# Patient Record
Sex: MALE | Race: WHITE | NOT HISPANIC OR LATINO | Employment: FULL TIME | URBAN - METROPOLITAN AREA
[De-identification: names, ages, dates, MRNs, and addresses within clinical notes are randomized per-mention and may not be internally consistent; named-entity substitution may affect disease eponyms.]

---

## 2019-06-21 ENCOUNTER — TRANSCRIBE ORDERS (OUTPATIENT)
Dept: ADMINISTRATIVE | Facility: HOSPITAL | Age: 44
End: 2019-06-21

## 2019-06-21 DIAGNOSIS — R07.89 CHEST WALL PAIN: Primary | ICD-10-CM

## 2019-06-26 ENCOUNTER — HOSPITAL ENCOUNTER (OUTPATIENT)
Dept: CT IMAGING | Facility: HOSPITAL | Age: 44
Discharge: HOME | End: 2019-06-26
Admitting: FAMILY MEDICINE

## 2019-06-26 DIAGNOSIS — R07.89 CHEST WALL PAIN: ICD-10-CM

## 2019-06-26 PROCEDURE — 71250 CT THORAX DX C-: CPT

## 2020-02-07 ENCOUNTER — ANESTHESIA EVENT (OUTPATIENT)
Dept: PERIOP | Facility: HOSPITAL | Age: 45
End: 2020-02-07

## 2020-02-07 ENCOUNTER — HOSPITAL ENCOUNTER (OUTPATIENT)
Facility: HOSPITAL | Age: 45
Discharge: HOME | End: 2020-02-07
Attending: EMERGENCY MEDICINE | Admitting: SURGERY

## 2020-02-07 ENCOUNTER — APPOINTMENT (OUTPATIENT)
Dept: CT IMAGING | Facility: HOSPITAL | Age: 45
End: 2020-02-07

## 2020-02-07 ENCOUNTER — ANESTHESIA (OUTPATIENT)
Dept: PERIOP | Facility: HOSPITAL | Age: 45
End: 2020-02-07

## 2020-02-07 VITALS
WEIGHT: 193 LBS | DIASTOLIC BLOOD PRESSURE: 67 MMHG | HEART RATE: 72 BPM | HEIGHT: 71 IN | SYSTOLIC BLOOD PRESSURE: 111 MMHG | TEMPERATURE: 98.6 F | OXYGEN SATURATION: 95 % | RESPIRATION RATE: 16 BRPM | BODY MASS INDEX: 27.02 KG/M2

## 2020-02-07 DIAGNOSIS — K35.80 ACUTE APPENDICITIS, UNSPECIFIED ACUTE APPENDICITIS TYPE: Primary | ICD-10-CM

## 2020-02-07 DIAGNOSIS — K37 APPENDICITIS: ICD-10-CM

## 2020-02-07 DIAGNOSIS — R10.33 PERIUMBILICAL ABDOMINAL PAIN: ICD-10-CM

## 2020-02-07 DIAGNOSIS — K35.890 OTHER ACUTE APPENDICITIS: ICD-10-CM

## 2020-02-07 LAB
ALBUMIN SERPL-MCNC: 4.4 G/DL (ref 3.5–5.2)
ALBUMIN/GLOB SERPL: 1.3 G/DL
ALP SERPL-CCNC: 111 U/L (ref 39–117)
ALT SERPL W P-5'-P-CCNC: 45 U/L (ref 1–41)
ANION GAP SERPL CALCULATED.3IONS-SCNC: 11 MMOL/L (ref 5–15)
AST SERPL-CCNC: 33 U/L (ref 1–40)
BASOPHILS # BLD AUTO: 0.03 10*3/MM3 (ref 0–0.2)
BASOPHILS NFR BLD AUTO: 0.5 % (ref 0–1.5)
BILIRUB SERPL-MCNC: 0.4 MG/DL (ref 0.2–1.2)
BILIRUB UR QL STRIP: NEGATIVE
BUN BLD-MCNC: 18 MG/DL (ref 6–20)
BUN/CREAT SERPL: 19.4 (ref 7–25)
CALCIUM SPEC-SCNC: 9.3 MG/DL (ref 8.6–10.5)
CHLORIDE SERPL-SCNC: 102 MMOL/L (ref 98–107)
CLARITY UR: CLEAR
CO2 SERPL-SCNC: 27 MMOL/L (ref 22–29)
COLOR UR: YELLOW
CREAT BLD-MCNC: 0.93 MG/DL (ref 0.76–1.27)
DEPRECATED RDW RBC AUTO: 39.2 FL (ref 37–54)
EOSINOPHIL # BLD AUTO: 0.18 10*3/MM3 (ref 0–0.4)
EOSINOPHIL NFR BLD AUTO: 2.8 % (ref 0.3–6.2)
ERYTHROCYTE [DISTWIDTH] IN BLOOD BY AUTOMATED COUNT: 12.1 % (ref 12.3–15.4)
GFR SERPL CREATININE-BSD FRML MDRD: 88 ML/MIN/1.73
GLOBULIN UR ELPH-MCNC: 3.4 GM/DL
GLUCOSE BLD-MCNC: 111 MG/DL (ref 65–99)
GLUCOSE UR STRIP-MCNC: NEGATIVE MG/DL
HCT VFR BLD AUTO: 48.2 % (ref 37.5–51)
HGB BLD-MCNC: 16.4 G/DL (ref 13–17.7)
HGB UR QL STRIP.AUTO: NEGATIVE
HOLD SPECIMEN: NORMAL
HOLD SPECIMEN: NORMAL
IMM GRANULOCYTES # BLD AUTO: 0.02 10*3/MM3 (ref 0–0.05)
IMM GRANULOCYTES NFR BLD AUTO: 0.3 % (ref 0–0.5)
KETONES UR QL STRIP: NEGATIVE
LEUKOCYTE ESTERASE UR QL STRIP.AUTO: NEGATIVE
LIPASE SERPL-CCNC: 31 U/L (ref 13–60)
LYMPHOCYTES # BLD AUTO: 1.5 10*3/MM3 (ref 0.7–3.1)
LYMPHOCYTES NFR BLD AUTO: 23.5 % (ref 19.6–45.3)
MCH RBC QN AUTO: 30.1 PG (ref 26.6–33)
MCHC RBC AUTO-ENTMCNC: 34 G/DL (ref 31.5–35.7)
MCV RBC AUTO: 88.6 FL (ref 79–97)
MONOCYTES # BLD AUTO: 0.52 10*3/MM3 (ref 0.1–0.9)
MONOCYTES NFR BLD AUTO: 8.1 % (ref 5–12)
NEUTROPHILS # BLD AUTO: 4.14 10*3/MM3 (ref 1.7–7)
NEUTROPHILS NFR BLD AUTO: 64.8 % (ref 42.7–76)
NITRITE UR QL STRIP: NEGATIVE
NRBC BLD AUTO-RTO: 0 /100 WBC (ref 0–0.2)
PH UR STRIP.AUTO: 7 [PH] (ref 5–8)
PLATELET # BLD AUTO: 182 10*3/MM3 (ref 140–450)
PMV BLD AUTO: 10 FL (ref 6–12)
POTASSIUM BLD-SCNC: 3.9 MMOL/L (ref 3.5–5.2)
PROT SERPL-MCNC: 7.8 G/DL (ref 6–8.5)
PROT UR QL STRIP: NEGATIVE
RBC # BLD AUTO: 5.44 10*6/MM3 (ref 4.14–5.8)
SODIUM BLD-SCNC: 140 MMOL/L (ref 136–145)
SP GR UR STRIP: 1.03 (ref 1–1.03)
UROBILINOGEN UR QL STRIP: ABNORMAL
WBC NRBC COR # BLD: 6.39 10*3/MM3 (ref 3.4–10.8)
WHOLE BLOOD HOLD SPECIMEN: NORMAL
WHOLE BLOOD HOLD SPECIMEN: NORMAL

## 2020-02-07 PROCEDURE — 25010000002 IOPAMIDOL 61 % SOLUTION: Performed by: EMERGENCY MEDICINE

## 2020-02-07 PROCEDURE — G0378 HOSPITAL OBSERVATION PER HR: HCPCS

## 2020-02-07 PROCEDURE — 25010000002 NEOSTIGMINE 10 MG/10ML SOLUTION: Performed by: NURSE ANESTHETIST, CERTIFIED REGISTERED

## 2020-02-07 PROCEDURE — 25010000002 CEFOXITIN PER 1 G: Performed by: SURGERY

## 2020-02-07 PROCEDURE — 25010000002 FENTANYL CITRATE (PF) 100 MCG/2ML SOLUTION: Performed by: NURSE ANESTHETIST, CERTIFIED REGISTERED

## 2020-02-07 PROCEDURE — 88304 TISSUE EXAM BY PATHOLOGIST: CPT | Performed by: SURGERY

## 2020-02-07 PROCEDURE — 81003 URINALYSIS AUTO W/O SCOPE: CPT | Performed by: EMERGENCY MEDICINE

## 2020-02-07 PROCEDURE — 83690 ASSAY OF LIPASE: CPT | Performed by: EMERGENCY MEDICINE

## 2020-02-07 PROCEDURE — 85025 COMPLETE CBC W/AUTO DIFF WBC: CPT | Performed by: EMERGENCY MEDICINE

## 2020-02-07 PROCEDURE — 25010000002 ONDANSETRON PER 1 MG: Performed by: EMERGENCY MEDICINE

## 2020-02-07 PROCEDURE — 25010000002 ONDANSETRON PER 1 MG: Performed by: SURGERY

## 2020-02-07 PROCEDURE — 80053 COMPREHEN METABOLIC PANEL: CPT | Performed by: EMERGENCY MEDICINE

## 2020-02-07 PROCEDURE — 25010000002 DEXAMETHASONE PER 1 MG: Performed by: NURSE ANESTHETIST, CERTIFIED REGISTERED

## 2020-02-07 PROCEDURE — 25010000002 HYDROMORPHONE PER 4 MG: Performed by: SURGERY

## 2020-02-07 PROCEDURE — 25010000002 PROPOFOL 10 MG/ML EMULSION: Performed by: NURSE ANESTHETIST, CERTIFIED REGISTERED

## 2020-02-07 PROCEDURE — 99244 OFF/OP CNSLTJ NEW/EST MOD 40: CPT | Performed by: SURGERY

## 2020-02-07 PROCEDURE — 99284 EMERGENCY DEPT VISIT MOD MDM: CPT

## 2020-02-07 PROCEDURE — 74177 CT ABD & PELVIS W/CONTRAST: CPT

## 2020-02-07 PROCEDURE — 44970 LAPAROSCOPY APPENDECTOMY: CPT | Performed by: SURGERY

## 2020-02-07 PROCEDURE — 96374 THER/PROPH/DIAG INJ IV PUSH: CPT

## 2020-02-07 PROCEDURE — 25010000002 CEFOXITIN PER 1 G: Performed by: EMERGENCY MEDICINE

## 2020-02-07 PROCEDURE — 25010000002 ONDANSETRON PER 1 MG: Performed by: NURSE ANESTHETIST, CERTIFIED REGISTERED

## 2020-02-07 DEVICE — ETS45 RELOAD STANDARD 45MM
Type: IMPLANTABLE DEVICE | Status: FUNCTIONAL
Brand: ENDOPATH

## 2020-02-07 DEVICE — ENDOPATH ETS45 2.5MM RELOADS (VASCULAR/THIN)
Type: IMPLANTABLE DEVICE | Status: FUNCTIONAL
Brand: ENDOPATH

## 2020-02-07 RX ORDER — METOCLOPRAMIDE HYDROCHLORIDE 5 MG/ML
10 INJECTION INTRAMUSCULAR; INTRAVENOUS EVERY 6 HOURS PRN
Status: DISCONTINUED | OUTPATIENT
Start: 2020-02-07 | End: 2020-02-08 | Stop reason: HOSPADM

## 2020-02-07 RX ORDER — CETIRIZINE HYDROCHLORIDE 10 MG/1
10 TABLET ORAL DAILY
COMMUNITY

## 2020-02-07 RX ORDER — LABETALOL HYDROCHLORIDE 5 MG/ML
5 INJECTION, SOLUTION INTRAVENOUS
Status: DISCONTINUED | OUTPATIENT
Start: 2020-02-07 | End: 2020-02-07 | Stop reason: HOSPADM

## 2020-02-07 RX ORDER — ACETAMINOPHEN 500 MG
1000 TABLET ORAL EVERY 12 HOURS
Status: DISCONTINUED | OUTPATIENT
Start: 2020-02-07 | End: 2020-02-07

## 2020-02-07 RX ORDER — ATRACURIUM BESYLATE 10 MG/ML
INJECTION, SOLUTION INTRAVENOUS AS NEEDED
Status: DISCONTINUED | OUTPATIENT
Start: 2020-02-07 | End: 2020-02-07 | Stop reason: SURG

## 2020-02-07 RX ORDER — PROMETHAZINE HYDROCHLORIDE 25 MG/ML
12.5 INJECTION, SOLUTION INTRAMUSCULAR; INTRAVENOUS ONCE AS NEEDED
Status: DISCONTINUED | OUTPATIENT
Start: 2020-02-07 | End: 2020-02-07 | Stop reason: HOSPADM

## 2020-02-07 RX ORDER — DEXAMETHASONE SODIUM PHOSPHATE 4 MG/ML
INJECTION, SOLUTION INTRA-ARTICULAR; INTRALESIONAL; INTRAMUSCULAR; INTRAVENOUS; SOFT TISSUE AS NEEDED
Status: DISCONTINUED | OUTPATIENT
Start: 2020-02-07 | End: 2020-02-07 | Stop reason: SURG

## 2020-02-07 RX ORDER — HYDROMORPHONE HYDROCHLORIDE 1 MG/ML
0.5 INJECTION, SOLUTION INTRAMUSCULAR; INTRAVENOUS; SUBCUTANEOUS
Status: DISCONTINUED | OUTPATIENT
Start: 2020-02-07 | End: 2020-02-07 | Stop reason: HOSPADM

## 2020-02-07 RX ORDER — SODIUM CHLORIDE, SODIUM LACTATE, POTASSIUM CHLORIDE, CALCIUM CHLORIDE 600; 310; 30; 20 MG/100ML; MG/100ML; MG/100ML; MG/100ML
125 INJECTION, SOLUTION INTRAVENOUS CONTINUOUS
Status: DISCONTINUED | OUTPATIENT
Start: 2020-02-07 | End: 2020-02-07 | Stop reason: HOSPADM

## 2020-02-07 RX ORDER — BUPIVACAINE HYDROCHLORIDE AND EPINEPHRINE 5; 5 MG/ML; UG/ML
INJECTION, SOLUTION EPIDURAL; INTRACAUDAL; PERINEURAL AS NEEDED
Status: DISCONTINUED | OUTPATIENT
Start: 2020-02-07 | End: 2020-02-07 | Stop reason: HOSPADM

## 2020-02-07 RX ORDER — FAMOTIDINE 10 MG/ML
20 INJECTION, SOLUTION INTRAVENOUS ONCE
Status: DISCONTINUED | OUTPATIENT
Start: 2020-02-07 | End: 2020-02-07 | Stop reason: HOSPADM

## 2020-02-07 RX ORDER — ONDANSETRON 2 MG/ML
4 INJECTION INTRAMUSCULAR; INTRAVENOUS
Status: DISCONTINUED | OUTPATIENT
Start: 2020-02-07 | End: 2020-02-07 | Stop reason: HOSPADM

## 2020-02-07 RX ORDER — OXYCODONE HYDROCHLORIDE 5 MG/1
5 TABLET ORAL EVERY 6 HOURS PRN
Qty: 10 TABLET | Refills: 0 | Status: SHIPPED | OUTPATIENT
Start: 2020-02-07 | End: 2020-02-12

## 2020-02-07 RX ORDER — PROMETHAZINE HYDROCHLORIDE 25 MG/1
25 TABLET ORAL ONCE AS NEEDED
Status: DISCONTINUED | OUTPATIENT
Start: 2020-02-07 | End: 2020-02-07 | Stop reason: HOSPADM

## 2020-02-07 RX ORDER — GLYCOPYRROLATE 0.2 MG/ML
INJECTION INTRAMUSCULAR; INTRAVENOUS AS NEEDED
Status: DISCONTINUED | OUTPATIENT
Start: 2020-02-07 | End: 2020-02-07 | Stop reason: SURG

## 2020-02-07 RX ORDER — CETIRIZINE HYDROCHLORIDE 10 MG/1
10 TABLET ORAL DAILY
Status: DISCONTINUED | OUTPATIENT
Start: 2020-02-07 | End: 2020-02-08 | Stop reason: HOSPADM

## 2020-02-07 RX ORDER — DIPHENHYDRAMINE HYDROCHLORIDE 50 MG/ML
25 INJECTION INTRAMUSCULAR; INTRAVENOUS EVERY 4 HOURS PRN
Status: DISCONTINUED | OUTPATIENT
Start: 2020-02-07 | End: 2020-02-08 | Stop reason: HOSPADM

## 2020-02-07 RX ORDER — ONDANSETRON 2 MG/ML
INJECTION INTRAMUSCULAR; INTRAVENOUS AS NEEDED
Status: DISCONTINUED | OUTPATIENT
Start: 2020-02-07 | End: 2020-02-07 | Stop reason: SURG

## 2020-02-07 RX ORDER — HYDROMORPHONE HYDROCHLORIDE 2 MG/1
2 TABLET ORAL
Status: DISCONTINUED | OUTPATIENT
Start: 2020-02-07 | End: 2020-02-08 | Stop reason: HOSPADM

## 2020-02-07 RX ORDER — PROMETHAZINE HYDROCHLORIDE 25 MG/ML
12.5 INJECTION, SOLUTION INTRAMUSCULAR; INTRAVENOUS EVERY 6 HOURS PRN
Status: DISCONTINUED | OUTPATIENT
Start: 2020-02-07 | End: 2020-02-08 | Stop reason: HOSPADM

## 2020-02-07 RX ORDER — PROPOFOL 10 MG/ML
VIAL (ML) INTRAVENOUS AS NEEDED
Status: DISCONTINUED | OUTPATIENT
Start: 2020-02-07 | End: 2020-02-07 | Stop reason: SURG

## 2020-02-07 RX ORDER — SODIUM CHLORIDE 0.9 % (FLUSH) 0.9 %
10 SYRINGE (ML) INJECTION EVERY 12 HOURS SCHEDULED
Status: DISCONTINUED | OUTPATIENT
Start: 2020-02-07 | End: 2020-02-07 | Stop reason: HOSPADM

## 2020-02-07 RX ORDER — FENTANYL CITRATE 50 UG/ML
50 INJECTION, SOLUTION INTRAMUSCULAR; INTRAVENOUS
Status: DISCONTINUED | OUTPATIENT
Start: 2020-02-07 | End: 2020-02-07 | Stop reason: HOSPADM

## 2020-02-07 RX ORDER — CEFOXITIN 1 G/1
2 INJECTION, POWDER, FOR SOLUTION INTRAVENOUS EVERY 6 HOURS
Status: DISCONTINUED | OUTPATIENT
Start: 2020-02-07 | End: 2020-02-07

## 2020-02-07 RX ORDER — OXYCODONE HYDROCHLORIDE 5 MG/1
5 TABLET ORAL EVERY 4 HOURS PRN
Status: DISCONTINUED | OUTPATIENT
Start: 2020-02-07 | End: 2020-02-08 | Stop reason: HOSPADM

## 2020-02-07 RX ORDER — FENTANYL CITRATE 50 UG/ML
INJECTION, SOLUTION INTRAMUSCULAR; INTRAVENOUS AS NEEDED
Status: DISCONTINUED | OUTPATIENT
Start: 2020-02-07 | End: 2020-02-07 | Stop reason: SURG

## 2020-02-07 RX ORDER — SODIUM CHLORIDE 0.9 % (FLUSH) 0.9 %
1-10 SYRINGE (ML) INJECTION AS NEEDED
Status: DISCONTINUED | OUTPATIENT
Start: 2020-02-07 | End: 2020-02-08 | Stop reason: HOSPADM

## 2020-02-07 RX ORDER — ONDANSETRON 4 MG/1
4 TABLET, FILM COATED ORAL EVERY 6 HOURS PRN
Status: DISCONTINUED | OUTPATIENT
Start: 2020-02-08 | End: 2020-02-08 | Stop reason: HOSPADM

## 2020-02-07 RX ORDER — SODIUM CHLORIDE, SODIUM LACTATE, POTASSIUM CHLORIDE, CALCIUM CHLORIDE 600; 310; 30; 20 MG/100ML; MG/100ML; MG/100ML; MG/100ML
9 INJECTION, SOLUTION INTRAVENOUS CONTINUOUS
Status: DISCONTINUED | OUTPATIENT
Start: 2020-02-07 | End: 2020-02-07 | Stop reason: HOSPADM

## 2020-02-07 RX ORDER — NALOXONE HCL 0.4 MG/ML
0.1 VIAL (ML) INJECTION
Status: DISCONTINUED | OUTPATIENT
Start: 2020-02-07 | End: 2020-02-08 | Stop reason: HOSPADM

## 2020-02-07 RX ORDER — SODIUM CHLORIDE 0.9 % (FLUSH) 0.9 %
10 SYRINGE (ML) INJECTION AS NEEDED
Status: DISCONTINUED | OUTPATIENT
Start: 2020-02-07 | End: 2020-02-07 | Stop reason: HOSPADM

## 2020-02-07 RX ORDER — FAMOTIDINE 20 MG/1
20 TABLET, FILM COATED ORAL
Status: DISCONTINUED | OUTPATIENT
Start: 2020-02-07 | End: 2020-02-08 | Stop reason: HOSPADM

## 2020-02-07 RX ORDER — HYDROCODONE BITARTRATE AND ACETAMINOPHEN 7.5; 325 MG/1; MG/1
1 TABLET ORAL ONCE AS NEEDED
Status: DISCONTINUED | OUTPATIENT
Start: 2020-02-07 | End: 2020-02-07 | Stop reason: HOSPADM

## 2020-02-07 RX ORDER — SIMETHICONE 80 MG
80 TABLET,CHEWABLE ORAL 4 TIMES DAILY PRN
Status: DISCONTINUED | OUTPATIENT
Start: 2020-02-07 | End: 2020-02-08 | Stop reason: HOSPADM

## 2020-02-07 RX ORDER — LIDOCAINE HYDROCHLORIDE 10 MG/ML
0.5 INJECTION, SOLUTION EPIDURAL; INFILTRATION; INTRACAUDAL; PERINEURAL ONCE AS NEEDED
Status: DISCONTINUED | OUTPATIENT
Start: 2020-02-07 | End: 2020-02-07 | Stop reason: HOSPADM

## 2020-02-07 RX ORDER — ACETAMINOPHEN 500 MG
1000 TABLET ORAL EVERY 6 HOURS
Status: DISCONTINUED | OUTPATIENT
Start: 2020-02-07 | End: 2020-02-08 | Stop reason: HOSPADM

## 2020-02-07 RX ORDER — LIDOCAINE HYDROCHLORIDE 10 MG/ML
INJECTION, SOLUTION EPIDURAL; INFILTRATION; INTRACAUDAL; PERINEURAL AS NEEDED
Status: DISCONTINUED | OUTPATIENT
Start: 2020-02-07 | End: 2020-02-07 | Stop reason: SURG

## 2020-02-07 RX ORDER — LABETALOL HYDROCHLORIDE 5 MG/ML
10 INJECTION, SOLUTION INTRAVENOUS
Status: DISCONTINUED | OUTPATIENT
Start: 2020-02-07 | End: 2020-02-08 | Stop reason: HOSPADM

## 2020-02-07 RX ORDER — SODIUM CHLORIDE 9 MG/ML
INJECTION, SOLUTION INTRAVENOUS AS NEEDED
Status: DISCONTINUED | OUTPATIENT
Start: 2020-02-07 | End: 2020-02-07 | Stop reason: HOSPADM

## 2020-02-07 RX ORDER — PROMETHAZINE HYDROCHLORIDE 25 MG/1
25 SUPPOSITORY RECTAL ONCE AS NEEDED
Status: DISCONTINUED | OUTPATIENT
Start: 2020-02-07 | End: 2020-02-07 | Stop reason: HOSPADM

## 2020-02-07 RX ORDER — PROMETHAZINE HYDROCHLORIDE 25 MG/1
12.5 TABLET ORAL EVERY 6 HOURS PRN
Status: DISCONTINUED | OUTPATIENT
Start: 2020-02-07 | End: 2020-02-08 | Stop reason: HOSPADM

## 2020-02-07 RX ORDER — ONDANSETRON 2 MG/ML
4 INJECTION INTRAMUSCULAR; INTRAVENOUS EVERY 6 HOURS
Status: DISCONTINUED | OUTPATIENT
Start: 2020-02-07 | End: 2020-02-08 | Stop reason: HOSPADM

## 2020-02-07 RX ORDER — SODIUM CHLORIDE 0.9 % (FLUSH) 0.9 %
3 SYRINGE (ML) INJECTION EVERY 12 HOURS SCHEDULED
Status: DISCONTINUED | OUTPATIENT
Start: 2020-02-07 | End: 2020-02-08 | Stop reason: HOSPADM

## 2020-02-07 RX ORDER — HYDROMORPHONE HYDROCHLORIDE 1 MG/ML
0.5 INJECTION, SOLUTION INTRAMUSCULAR; INTRAVENOUS; SUBCUTANEOUS
Status: DISCONTINUED | OUTPATIENT
Start: 2020-02-07 | End: 2020-02-08 | Stop reason: HOSPADM

## 2020-02-07 RX ORDER — RANITIDINE 150 MG/1
150 TABLET ORAL 2 TIMES DAILY
COMMUNITY

## 2020-02-07 RX ORDER — NEOSTIGMINE METHYLSULFATE 1 MG/ML
INJECTION, SOLUTION INTRAVENOUS AS NEEDED
Status: DISCONTINUED | OUTPATIENT
Start: 2020-02-07 | End: 2020-02-07 | Stop reason: SURG

## 2020-02-07 RX ORDER — FAMOTIDINE 20 MG/1
20 TABLET, FILM COATED ORAL ONCE
Status: COMPLETED | OUTPATIENT
Start: 2020-02-07 | End: 2020-02-07

## 2020-02-07 RX ORDER — SODIUM CHLORIDE, SODIUM LACTATE, POTASSIUM CHLORIDE, CALCIUM CHLORIDE 600; 310; 30; 20 MG/100ML; MG/100ML; MG/100ML; MG/100ML
150 INJECTION, SOLUTION INTRAVENOUS CONTINUOUS
Status: DISCONTINUED | OUTPATIENT
Start: 2020-02-07 | End: 2020-02-08 | Stop reason: HOSPADM

## 2020-02-07 RX ORDER — PROCHLORPERAZINE MALEATE 10 MG
10 TABLET ORAL EVERY 6 HOURS PRN
Status: DISCONTINUED | OUTPATIENT
Start: 2020-02-07 | End: 2020-02-08 | Stop reason: HOSPADM

## 2020-02-07 RX ADMIN — GLYCOPYRROLATE 0.1 MG: 0.2 INJECTION, SOLUTION INTRAMUSCULAR; INTRAVENOUS at 10:24

## 2020-02-07 RX ADMIN — NEOSTIGMINE METHYLSULFATE 2 MG: 1 INJECTION, SOLUTION INTRAVENOUS at 10:24

## 2020-02-07 RX ADMIN — HYDROMORPHONE HYDROCHLORIDE 0.5 MG: 1 INJECTION, SOLUTION INTRAMUSCULAR; INTRAVENOUS; SUBCUTANEOUS at 07:41

## 2020-02-07 RX ADMIN — ACETAMINOPHEN 1000 MG: 500 TABLET, FILM COATED ORAL at 12:12

## 2020-02-07 RX ADMIN — SODIUM CHLORIDE, PRESERVATIVE FREE 10 ML: 5 INJECTION INTRAVENOUS at 09:05

## 2020-02-07 RX ADMIN — SODIUM CHLORIDE, POTASSIUM CHLORIDE, SODIUM LACTATE AND CALCIUM CHLORIDE 9 ML/HR: 600; 310; 30; 20 INJECTION, SOLUTION INTRAVENOUS at 09:05

## 2020-02-07 RX ADMIN — PROPOFOL 200 MG: 10 INJECTION, EMULSION INTRAVENOUS at 09:31

## 2020-02-07 RX ADMIN — SODIUM CHLORIDE, POTASSIUM CHLORIDE, SODIUM LACTATE AND CALCIUM CHLORIDE: 600; 310; 30; 20 INJECTION, SOLUTION INTRAVENOUS at 10:09

## 2020-02-07 RX ADMIN — SODIUM CHLORIDE, POTASSIUM CHLORIDE, SODIUM LACTATE AND CALCIUM CHLORIDE 125 ML/HR: 600; 310; 30; 20 INJECTION, SOLUTION INTRAVENOUS at 05:36

## 2020-02-07 RX ADMIN — FENTANYL CITRATE 100 MCG: 50 INJECTION, SOLUTION INTRAMUSCULAR; INTRAVENOUS at 09:31

## 2020-02-07 RX ADMIN — FAMOTIDINE 20 MG: 20 TABLET ORAL at 18:10

## 2020-02-07 RX ADMIN — ONDANSETRON 4 MG: 2 INJECTION INTRAMUSCULAR; INTRAVENOUS at 09:35

## 2020-02-07 RX ADMIN — FAMOTIDINE 20 MG: 20 TABLET ORAL at 09:11

## 2020-02-07 RX ADMIN — OXYCODONE HYDROCHLORIDE 5 MG: 5 TABLET ORAL at 18:13

## 2020-02-07 RX ADMIN — CEFOXITIN SODIUM 2 G: 1 POWDER, FOR SOLUTION INTRAVENOUS at 12:13

## 2020-02-07 RX ADMIN — ONDANSETRON 4 MG: 2 INJECTION INTRAMUSCULAR; INTRAVENOUS at 18:10

## 2020-02-07 RX ADMIN — LIDOCAINE HYDROCHLORIDE 60 MG: 10 INJECTION, SOLUTION EPIDURAL; INFILTRATION; INTRACAUDAL; PERINEURAL at 09:31

## 2020-02-07 RX ADMIN — ONDANSETRON 4 MG: 2 INJECTION INTRAMUSCULAR; INTRAVENOUS at 03:06

## 2020-02-07 RX ADMIN — CEFOXITIN 2 G: 2 INJECTION, POWDER, FOR SOLUTION INTRAVENOUS at 05:36

## 2020-02-07 RX ADMIN — ONDANSETRON 4 MG: 2 INJECTION INTRAMUSCULAR; INTRAVENOUS at 12:12

## 2020-02-07 RX ADMIN — CEFOXITIN SODIUM 2 G: 1 POWDER, FOR SOLUTION INTRAVENOUS at 18:11

## 2020-02-07 RX ADMIN — FENTANYL CITRATE 50 MCG: 0.05 INJECTION, SOLUTION INTRAMUSCULAR; INTRAVENOUS at 11:14

## 2020-02-07 RX ADMIN — ATRACURIUM BESYLATE 30 MG: 10 INJECTION, SOLUTION INTRAVENOUS at 09:31

## 2020-02-07 RX ADMIN — FENTANYL CITRATE 50 MCG: 0.05 INJECTION, SOLUTION INTRAMUSCULAR; INTRAVENOUS at 10:56

## 2020-02-07 RX ADMIN — DEXAMETHASONE SODIUM PHOSPHATE 8 MG: 4 INJECTION, SOLUTION INTRAMUSCULAR; INTRAVENOUS at 09:35

## 2020-02-07 RX ADMIN — IOPAMIDOL 100 ML: 612 INJECTION, SOLUTION INTRAVENOUS at 03:39

## 2020-02-07 RX ADMIN — ACETAMINOPHEN 1000 MG: 500 TABLET ORAL at 19:36

## 2020-02-07 RX ADMIN — SODIUM CHLORIDE 1000 ML: 9 INJECTION, SOLUTION INTRAVENOUS at 03:09

## 2020-02-07 NOTE — OP NOTE
OPERATIVE REPORT    DATE: 02/07/20    PATIENT: Grace Ellison    PREOPERATIVE DIAGNOSIS:    1. Acute appendicitis    POSTOPERATIVE DIAGNOSIS:    1. Acute appendicitis    PROCEDURES PERFORMED:  1. Laparoscopic appendectomy    SURGEON:  Xenia Hardin M.D.    ANESTHESIA:  General endotracheal    ESTIMATED BLOOD LOSS:   50 mL    FLUIDS:  Crystalloids.    SPECIMENS:  Appendix with appendicolith    DRAINS:  None.    COUNTS:  Correct.    COMPLICATIONS:  None.    FINDINGS: Non-perforated tip appendicitis with appendicolith; retroperitoneal position of appendix      INDICATIONS:   Grace Ellison is a 44 y.o. year old male who presented with acute abdominal and right lower flank pain.  A CT scan revealed acute appendicitis.  He was educated on the risks, benefits, and alternatives, and has given consent for laparoscopic appendectomy.        DESCRIPTION OF PROCEDURE:   The patient was brought to the operating room and placed supine upon the operating room table. SCDs were placed. The patient underwent uneventful general endotracheal anesthesia per the anesthesiology staff.  He received IV Mefoxin earlier in the day. The abdomen was prepped with ChloraPrep and draped in the usual sterile fashion. An Ioban was used as well.  Timeout was performed.     A small longitudinal infraumblical incision was made.  The umbilical stalk was grasped and elevated and the fascia was incised with a scalpel.  The peritoneum was entered and the fascial opening digitalized.  An 0-Vicryl U-stitch was placed on the fascia, and a Sanchez trocar was placed.   The abdomen was then insufflated to a pressure of 16 mmHg with CO2 gas. Exploratory laparoscopy revealed no evidence of injury from the entrance technique.  I identified the decompressed bladder, and placed a 5 mm port well away from it in the suprapubic area, and a 5 mm port in the left lower quadrant.      The appendix was identified and was found to be mostly  retroperitoneal/retrocecal.  The cecum and terminal ileum were also identified.  The tip of the appendix was bulbous and clearly inflamed, while the proximal 2/3 of the appendix was normal in caliber and only mildly inflamed.  I made a window at the base of the appendix, and divided it with a blue load SOSA stapler.  There was some bleeding on the cecal side of the staple line, and this was controlled with clips.  I began to dissect the rest of the appendix off its retroperitoneal attachments and ascending colon attachments.  I stayed close on the appendix so as to avoid injury to surrounding structures.  The mesoappendix was finally freed up and was divided with 3 firings of the SOSA stapler with white loads.  This area was quite friable, and I also used clips on the staple line of the mesoappendix to control bleeding.  The appendix was removed in an endocatch bag through the umbilical port.  The fascial incision required further incision with a knife as the tip of the appendix was so big.  The appendix was passed off for permanent specimen.  The staple lines and right lower quadrant were examined, and were hemostatic.      All ports were removed under direct visualization and all of these were hemostatic. The instruments were removed and the abdomen was desufflated.  The infraumbilical fascia was closed with an 0-Vicryl U stitch, and the skin incisions were closed with 3-0 monocryl interrupted sutures. Skin Affix was placed.  I injected 15 cc of Marcaine 0.5% with epinephrine.  The patient was awakened and taken to recovery in good condition, having tolerated the procedure well. All sponge, needle, and instrument counts were correct.

## 2020-02-07 NOTE — ANESTHESIA PREPROCEDURE EVALUATION
Anesthesia Evaluation                  Airway   Mallampati: II  Dental      Pulmonary    Cardiovascular         Neuro/Psych  GI/Hepatic/Renal/Endo    (+)  GERD,      Musculoskeletal     Abdominal    Substance History      OB/GYN          Other                        Anesthesia Plan    ASA 1     general     intravenous induction     Anesthetic plan, all risks, benefits, and alternatives have been provided, discussed and informed consent has been obtained with: patient.

## 2020-02-07 NOTE — BRIEF OP NOTE
APPENDECTOMY LAPAROSCOPIC  Progress Note    Grace Ellison  2/7/2020    Pre-op Diagnosis:   acute appendicitis       Post-Op Diagnosis Codes:     * Acute appendicitis [540]    Procedure/CPT® Codes:  [place] LAP,APPENDECTOMY [73801]    Procedure(s):  APPENDECTOMY LAPAROSCOPIC    Surgeon(s):  Xenia Hardin MD    Anesthesia: General    Staff:   Circulator: Torri Lu RN  Scrub Person: Angelo Tapia    Estimated Blood Loss: 50 mL    Urine Voided: * No values recorded between 2/7/2020  9:29 AM and 2/7/2020 10:46 AM *    Specimens:                Specimens     ID Source Type Tests Collected By Collected At Frozen?      A Large Intestine, Appendix Tissue · TISSUE PATHOLOGY EXAM   Xenia Hardin MD 2/7/20 1024 No                 Drains: * No LDAs found *    Findings: Non-perforated tip appendicitis with appendicolith; retroperitoneal position of appendix    Complications: none      Xenia Hardin MD     Date: 2/7/2020  Time: 11:43 AM

## 2020-02-07 NOTE — ED PROVIDER NOTES
EMERGENCY DEPARTMENT ENCOUNTER      Pt Name: Grace Ellison  MRN: [id number]  YOB: 1975  Date of evaluation: 2/7/2020  Provider: Jaison Spain DO    CHIEF COMPLAINT       Chief Complaint   Patient presents with   • Abdominal Pain         HISTORY OF PRESENT ILLNESS  (Location/Symptom, Timing/Onset, Context/Setting, Quality, Duration, Modifying Factors, Severity.)   Grace Ellison is a 44 y.o. male who presents to the emergency department for evaluation of right flank, right upper abdominal discomfort since yesterday evening, with some movement of the pain to the periumbilical region, this is sharp in nature, has been waxing waning consistency overnight.  The patient denies any fall, injury or trauma, no nausea vomiting or diarrhea, no fevers or chills, has had exposures with his family with a viral type RSV illness.  The patient denies any prior abdominal surgeries.  No history of kidney dysfunction, no dysuria.  No hematuria or kidney stone history.  He denies any other acute systemic complaints at this time.  Only takes medications for seasonal allergies.      Nursing notes were reviewed.    REVIEW OF SYSTEMS    (2-9 systems for level 4, 10 or more for level 5)   ROS:  General:  No fevers, no chills, no weakness  Cardiovascular:  No chest pain, no palpitations  Respiratory:  No shortness of breath, no cough, no wheezing  Gastrointestinal:  + Right upper and epigastric/periumbilical pain, no nausea, no vomiting, no diarrhea  Musculoskeletal:  No muscle pain, no joint pain  Skin:  No rash, no easy bruising  Neurologic:  No speech problems, no headache, no extremity numbness, no extremity tingling, no extremity weakness  Psychiatric:  No anxiety  Genitourinary:  No dysuria, no hematuria    Except as noted above the remainder of the review of systems was reviewed and negative.       PAST MEDICAL HISTORY   No past medical history on file.      SURGICAL HISTORY     No past surgical history  "on file.      CURRENT MEDICATIONS       Current Facility-Administered Medications:   •  ceFOXitin (MEFOXIN) injection 2 g, 2 g, Intramuscular, Q6H, Jaison Spain DO  •  lactated ringers infusion, 125 mL/hr, Intravenous, Continuous, Jaison Spain DO  •  ondansetron (ZOFRAN) injection 4 mg, 4 mg, Intravenous, Q30 Min PRN, Jaison Spain DO, 4 mg at 02/07/20 0306  •  sodium chloride 0.9 % flush 10 mL, 10 mL, Intravenous, PRN, Jaison Spain DO  No current outpatient medications on file.    ALLERGIES     Patient has no known allergies.    FAMILY HISTORY     No family history on file.       SOCIAL HISTORY       Social History     Socioeconomic History   • Marital status:      Spouse name: Not on file   • Number of children: Not on file   • Years of education: Not on file   • Highest education level: Not on file         PHYSICAL EXAM    (up to 7 for level 4, 8 or more for level 5)     Vitals:    02/07/20 0249   BP: (!) 141/122   BP Location: Left arm   Patient Position: Sitting   Pulse: 86   Resp: 16   Temp: 98.1 °F (36.7 °C)   TempSrc: Oral   SpO2: 96%   Weight: 87.5 kg (193 lb)   Height: 180.3 cm (71\")       Physical Exam  General :Patient is awake, alert, oriented, in no acute distress, nontoxic appearing  HEENT: Pupils are equally round and reactive to light, EOMI, conjunctivae clear, sclerae white, there is no injection no icterus.  Oral mucosa is moist, no exudate. Uvula is midline  Neck: Neck is supple, full range of motion, trachea midline  Cardiac: Heart regular rate, rhythm, no murmurs, rubs, or gallops  Lungs: Lungs are clear to auscultation, there is no wheezing, rhonchi, or rales. There is no use of accessory muscles.  Chest wall: There is no tenderness to palpation over the chest wall or over ribs  Abdomen: Abdomen is soft, nondistended.  There is tenderness to palpation on the epigastric and periumbilical region, no peritoneal signs, no tenderness along the lower " quadrant, no right lower quadrant tenderness. There is no firm or pulsatile masses, no rebound rigidity or guarding.   Musculoskeletal: 5 out of 5 strength in all 4 extremities.  No focal muscle deficits are appreciated  Neuro: Motor intact, sensory intact, level of consciousness is normal  Dermatology: Skin is warm and dry  Psych: Mentation is grossly normal, cognition is grossly normal. Affect is appropriate.      DIAGNOSTIC RESULTS     EKG: All EKG's are interpreted by the Emergency Department Physician who either signs or Co-signs this chart in the absence of a cardiologist.    No orders to display       RADIOLOGY:   Non-plain film images such as CT, Ultrasound and MRI are read by the radiologist. Plain radiographic images are visualized and preliminarily interpreted by the emergency physician with the below findings:      [] Radiologist's Report Reviewed:  CT Abdomen Pelvis With Contrast   Final Result   1.  Moderately severe acute appendicitis. Fluid-distended appendix with appendicolith. No drainable abscess or evidence of bowel obstruction.   2.  The remainder of the examination is negative.      Signer Name: Jonh Fair MD    Signed: 2/7/2020 4:00 AM    Workstation Name: [email or web address]     [facility]            ED BEDSIDE ULTRASOUND:   Performed by ED Physician - none    LABS:    I have reviewed and interpreted all of the currently available lab results from this visit (if applicable):  Results for orders placed or performed during the hospital encounter of 02/07/20   Comprehensive Metabolic Panel   Result Value Ref Range    Glucose 111 (H) 65 - 99 mg/dL    BUN 18 6 - 20 mg/dL    Creatinine 0.93 0.76 - 1.27 mg/dL    Sodium 140 136 - 145 mmol/L    Potassium 3.9 3.5 - 5.2 mmol/L    Chloride 102 98 - 107 mmol/L    CO2 27.0 22.0 - 29.0 mmol/L    Calcium 9.3 8.6 - 10.5 mg/dL    Total Protein 7.8 6.0 - 8.5 g/dL    Albumin 4.40 3.50 - 5.20 g/dL    ALT (SGPT) 45 (H) 1 - 41 U/L    AST  "(SGOT) 33 1 - 40 U/L    Alkaline Phosphatase 111 39 - 117 U/L    Total Bilirubin 0.4 0.2 - 1.2 mg/dL    eGFR Non African Amer 88 >60 mL/min/1.73    Globulin 3.4 gm/dL    A/G Ratio 1.3 g/dL    BUN/Creatinine Ratio 19.4 7.0 - 25.0    Anion Gap 11.0 5.0 - 15.0 mmol/L   Lipase   Result Value Ref Range    Lipase 31 13 - 60 U/L   CBC Auto Differential   Result Value Ref Range    WBC 6.39 3.40 - 10.80 10*3/mm3    RBC 5.44 4.14 - 5.80 10*6/mm3    Hemoglobin 16.4 13.0 - 17.7 g/dL    Hematocrit 48.2 37.5 - 51.0 %    MCV 88.6 79.0 - 97.0 fL    MCH 30.1 26.6 - 33.0 pg    MCHC 34.0 31.5 - 35.7 g/dL    RDW 12.1 (L) 12.3 - 15.4 %    RDW-SD 39.2 37.0 - 54.0 fl    MPV 10.0 6.0 - 12.0 fL    Platelets 182 140 - 450 10*3/mm3    Neutrophil % 64.8 42.7 - 76.0 %    Lymphocyte % 23.5 19.6 - 45.3 %    Monocyte % 8.1 5.0 - 12.0 %    Eosinophil % 2.8 0.3 - 6.2 %    Basophil % 0.5 0.0 - 1.5 %    Immature Grans % 0.3 0.0 - 0.5 %    Neutrophils, Absolute 4.14 1.70 - 7.00 10*3/mm3    Lymphocytes, Absolute 1.50 0.70 - 3.10 10*3/mm3    Monocytes, Absolute 0.52 0.10 - 0.90 10*3/mm3    Eosinophils, Absolute 0.18 0.00 - 0.40 10*3/mm3    Basophils, Absolute 0.03 0.00 - 0.20 10*3/mm3    Immature Grans, Absolute 0.02 0.00 - 0.05 10*3/mm3    nRBC 0.0 0.0 - 0.2 /100 WBC        All other labs were within normal range or not returned as of this dictation.      EMERGENCY DEPARTMENT COURSE and DIFFERENTIAL DIAGNOSIS/MDM:   Vitals:    Vitals:    02/07/20 0249   BP: (!) 141/122   BP Location: Left arm   Patient Position: Sitting   Pulse: 86   Resp: 16   Temp: 98.1 °F (36.7 °C)   TempSrc: Oral   SpO2: 96%   Weight: 87.5 kg (193 lb)   Height: 180.3 cm (71\")        !    Patient with upper right and periumbilical abdominal pain over the evening.  Does not appear acutely ill or toxic, no peritoneal signs on examination.  With his progression of his symptoms we discussed obtaining IV, labs, further imaging for more detailed evaluation.  Blood work and labs with no " acute abnormalities, white count is normal.  CT scan does reveal a significant acute appendicitis with appendicolith, appendiceal dilation with cody-appendiceal stranding.  No signs of abscess at this time or drainable fluid collection or bowel obstruction.  At this point I did discuss the case with Dr. Hardin, general surgery on-call.  Will start the patient on lactated Ringer's at 125 cc/h, Mefoxin 2 g IV every 6 hours, plan for admission with surgical intervention.  Patient updated on the results, plan of care, n.p.o. status.          MEDICATIONS ADMINISTERED IN ED:  Medications   sodium chloride 0.9 % flush 10 mL (has no administration in time range)   ondansetron (ZOFRAN) injection 4 mg (4 mg Intravenous Given 2/7/20 0306)   lactated ringers infusion (has no administration in time range)   ceFOXitin (MEFOXIN) injection 2 g (has no administration in time range)   sodium chloride 0.9 % bolus 1,000 mL (1,000 mL Intravenous New Bag 2/7/20 0309)   iopamidol (ISOVUE-300) 61 % injection 100 mL (100 mL Intravenous Given 2/7/20 0339)       PROCEDURES:  Procedures    CRITICAL CARE TIME    Total Critical Care time was 0 minutes, excluding separately reportable procedures.   There was a high probability of clinically significant/life threatening deterioration in the patient's condition which required my urgent intervention.      FINAL IMPRESSION      1. Acute appendicitis, unspecified acute appendicitis type    2. Periumbilical abdominal pain          DISPOSITION/PLAN     ED Disposition     ED Disposition Condition Comment    Decision to Admit  Level of Care: Med/Surg [1]   Admitting Physician: LIANG HARDIN [[id number]]            PATIENT REFERRED TO:  No follow-up provider specified.    DISCHARGE MEDICATIONS:     Medication List      You have not been prescribed any medications.       Comment: Please note this report has been produced using speech recognition software.      Jaison Spain,   Attending  Emergency Physician               Jaison Spain,   02/07/20 0411

## 2020-02-07 NOTE — PLAN OF CARE
Problem: Patient Care Overview  Goal: Plan of Care Review  Outcome: Ongoing (interventions implemented as appropriate)  Flowsheets (Taken 2/7/2020 2204)  Progress: no change  Plan of Care Reviewed With: patient  Outcome Summary: admitted for appendicitis  Note:   Patient sitting in recliner since admit for appendicitis, states no pain unless he moves. Ivfs and abx initiated. To have surgery sometime today.

## 2020-02-07 NOTE — H&P
GENERAL SURGERY H&P      Patient Care Team:  Lj Sosa MD as PCP - General (Family Medicine)    Chief complaint: acute appendicitis    Subjective     History of Present Illness     Mr. Grace Ellison is a 44 year old male, previously in good health who came to ED with worsening abdominal pain in RLQ and around umbilicus.  The pain started about 24 hours ago, and worsened overnight, awakening him from sleep.  No associated n/v/anorexia.  Denies fever.      Workup in ED revealed dilated appendix (1.7 cm) with appendicolith.  No leukocytosis.    Review of Systems   Constitutional: Negative for chills and fever.   Gastrointestinal: Positive for abdominal pain. Negative for nausea and vomiting.   All other systems reviewed and are negative.       Past Medical History:   Diagnosis Date   • GERD (gastroesophageal reflux disease)    • Seasonal allergies      Past Surgical History:   Procedure Laterality Date   • LYMPH NODE BIOPSY     • WISDOM TOOTH EXTRACTION       History reviewed. No pertinent family history.  Social History     Tobacco Use   • Smoking status: Never Smoker   • Smokeless tobacco: Never Used   Substance Use Topics   • Alcohol use: Yes     Comment: social use, 2 beers weekly   • Drug use: Never     Medications Prior to Admission   Medication Sig Dispense Refill Last Dose   • cetirizine (zyrTEC) 10 MG tablet Take 10 mg by mouth Daily.   2/6/2020 at Unknown time   • raNITIdine (ZANTAC) 150 MG tablet Take 150 mg by mouth 2 (Two) Times a Day.   2/6/2020 at Unknown time     Allergies:  Patient has no known allergies.    Objective      Vital Signs  Temp:  [97.3 °F (36.3 °C)-98.6 °F (37 °C)] 97.3 °F (36.3 °C)  Heart Rate:  [44-86] 58  Resp:  [16-20] 16  BP: (109-141)/() 131/83    Physical Exam   Constitutional: He is oriented to person, place, and time. He appears well-developed and well-nourished. No distress.   HENT:   Head: Normocephalic and atraumatic.   Mouth/Throat: No oropharyngeal exudate.    Eyes: Pupils are equal, round, and reactive to light. Conjunctivae and EOM are normal.   Pulmonary/Chest: Effort normal. No respiratory distress.   Abdominal: Soft. He exhibits no distension.   Slightly tender in RLQ.  No localized or generalized peritonitis.  No surgical scars   Neurological: He is alert and oriented to person, place, and time. No cranial nerve deficit.   Skin: Skin is warm and dry. He is not diaphoretic. No pallor.   Psychiatric: He has a normal mood and affect. His behavior is normal. Thought content normal.       Results Review:   I reviewed the patient's new clinical results.  I reviewed the patient's new imaging results and agree with the interpretation.  I reviewed the patient's other test results and agree with the interpretation      Assessment/Plan       Acute appendicitis      Assessment & Plan     Acute appendicitis    Laparoscopic appendectomy.  R/b/a explained to patient (no family was present), including but not limited to infection, bleeding, anesthesia complications, blood clots, injury to surrounding structures.  He had opportunity to ask questions, and gave informed consent for surgery.    Mefoxin IV now, SCDs on call to OR.    I discussed the patients findings and my recommendations with patient and nursing staff    Xenia Hardin MD  02/07/20  11:24 AM

## 2020-02-07 NOTE — ANESTHESIA POSTPROCEDURE EVALUATION
Patient: Grace Ellison    Procedure Summary     Date:  02/07/20 Room / Location:   [facility] /  [facility]    Anesthesia Start:  0929 Anesthesia Stop:  1052    Procedure:  APPENDECTOMY LAPAROSCOPIC (N/A Abdomen) Diagnosis:      Surgeon:  Xenia Hardin MD Provider:  Lawrence Machado MD    Anesthesia Type:  general ASA Status:  1          Anesthesia Type: general    Vitals  Vitals Value Taken Time   /86 2/7/2020 10:52 AM   Temp 97.3 °F (36.3 °C) 2/7/2020 10:52 AM   Pulse 54 2/7/2020 10:52 AM   Resp 16 2/7/2020 10:52 AM   SpO2 92 % 2/7/2020 10:52 AM           Post Anesthesia Care and Evaluation    Patient location during evaluation: PACU  Patient participation: complete - patient participated  Level of consciousness: awake and alert  Pain management: adequate  Airway patency: patent  Anesthetic complications: No anesthetic complications  PONV Status: none  Cardiovascular status: acceptable  Respiratory status: acceptable  Hydration status: acceptable

## 2020-02-12 ENCOUNTER — OFFICE VISIT (OUTPATIENT)
Dept: BARIATRICS/WEIGHT MGMT | Facility: CLINIC | Age: 45
End: 2020-02-12

## 2020-02-12 VITALS
TEMPERATURE: 96.8 F | OXYGEN SATURATION: 99 % | RESPIRATION RATE: 18 BRPM | DIASTOLIC BLOOD PRESSURE: 78 MMHG | BODY MASS INDEX: 26.53 KG/M2 | HEIGHT: 71 IN | WEIGHT: 189.5 LBS | SYSTOLIC BLOOD PRESSURE: 124 MMHG | HEART RATE: 79 BPM

## 2020-02-12 DIAGNOSIS — K35.80 ACUTE APPENDICITIS, UNSPECIFIED ACUTE APPENDICITIS TYPE: Primary | ICD-10-CM

## 2020-02-12 LAB
CYTO UR: NORMAL
LAB AP CASE REPORT: NORMAL
LAB AP CLINICAL INFORMATION: NORMAL
PATH REPORT.FINAL DX SPEC: NORMAL
PATH REPORT.GROSS SPEC: NORMAL

## 2020-02-12 PROCEDURE — 99024 POSTOP FOLLOW-UP VISIT: CPT | Performed by: SURGERY

## 2020-02-12 NOTE — PROGRESS NOTES
[facility]  [address]  [address]  [facility] [address]  [phone]        Patient Name: Grace Ellison.  YOB: 1975      Date of Visit: 02/12/2020      Reason for Visit:  POD#5 s/p appendectomy    HPI:  Grace Ellison is a 44 y.o. male s/p 2/7/19 laparoscopic appendectomy.  He was discharged the evening of surgery.  Operative findings were retroperitoneal location of appendix, which explains why he had less abdominal pain and more back pain.  He is feeling much better.  Eating well, has had a BM.  Had some referred shoulder pain the day after surgery but this has resolved.  Denies fevers.    His pathology report is not yet back--I will mail this to him when it returns in the next few days.          Past Medical History:   Diagnosis Date   • GERD (gastroesophageal reflux disease)    • Seasonal allergies      Past Surgical History:   Procedure Laterality Date   • APPENDECTOMY N/A 2/7/2020    Procedure: APPENDECTOMY LAPAROSCOPIC;  Surgeon: Xenia Hardin MD;  Location: [facility];  Service: General;  Laterality: N/A;   • LYMPH NODE BIOPSY     • WISDOM TOOTH EXTRACTION       Outpatient Medications Marked as Taking for the 2/12/20 encounter (Office Visit) with Xenia Hardin MD   Medication Sig Dispense Refill   • cetirizine (zyrTEC) 10 MG tablet Take 10 mg by mouth Daily.     • raNITIdine (ZANTAC) 150 MG tablet Take 150 mg by mouth 2 (Two) Times a Day.       No Known Allergies    Social History     Socioeconomic History   • Marital status:      Spouse name: Not on file   • Number of children: Not on file   • Years of education: Not on file   • Highest education level: Not on file   Tobacco Use   • Smoking status: Never Smoker   • Smokeless tobacco: Never Used   Substance and Sexual Activity   • Alcohol use: Yes     Comment: social use, 2 beers weekly   • Drug use: Never   • Sexual activity: Yes     Partners: Female        Vitals:    02/12/20 1022   BP: 124/78   Pulse: 79   Resp: 18   Temp: 96.8 °F (36 °C)   SpO2: 99%     Weight 86 kg (189 lb 8 oz)  Body mass index is 26.43 kg/m².    Physical Exam   Constitutional: He is oriented to person, place, and time. He appears well-developed and well-nourished. No distress.   HENT:   Head: Normocephalic and atraumatic.   Mouth/Throat: No oropharyngeal exudate.   Eyes: Pupils are equal, round, and reactive to light. Conjunctivae and EOM are normal.   Pulmonary/Chest: Effort normal. No respiratory distress.   Abdominal: Soft. He exhibits no distension.   Incisions are well-healing without induration, erythema, fluctuance, or drainage.  Faded bruising.     Neurological: He is alert and oriented to person, place, and time. No cranial nerve deficit.   Skin: Skin is warm and dry. He is not diaphoretic. No pallor.   Psychiatric: He has a normal mood and affect. His behavior is normal. Thought content normal.         Assessment:      ICD-10-CM ICD-9-CM   1. Acute appendicitis, unspecified acute appendicitis type K35.80 540.9       Plan:  Doing well.  F/u PRN.  Will mail op note and path report when it results for his records.

## 2020-07-20 ENCOUNTER — TRANSCRIBE ORDERS (OUTPATIENT)
Dept: ADMINISTRATIVE | Facility: HOSPITAL | Age: 45
End: 2020-07-20

## 2020-07-20 DIAGNOSIS — R10.9 ABDOMINAL PAIN, UNSPECIFIED ABDOMINAL LOCATION: Primary | ICD-10-CM

## 2020-07-24 ENCOUNTER — APPOINTMENT (OUTPATIENT)
Dept: CT IMAGING | Facility: HOSPITAL | Age: 45
End: 2020-07-24

## 2020-07-29 ENCOUNTER — HOSPITAL ENCOUNTER (OUTPATIENT)
Dept: CT IMAGING | Facility: HOSPITAL | Age: 45
Discharge: HOME | End: 2020-07-29
Admitting: FAMILY MEDICINE

## 2020-07-29 DIAGNOSIS — R10.9 ABDOMINAL PAIN, UNSPECIFIED ABDOMINAL LOCATION: ICD-10-CM

## 2020-07-29 PROCEDURE — 74177 CT ABD & PELVIS W/CONTRAST: CPT

## 2020-07-29 PROCEDURE — 25010000002 IOPAMIDOL 61 % SOLUTION: Performed by: FAMILY MEDICINE

## 2020-07-29 RX ADMIN — IOPAMIDOL 95 ML: 612 INJECTION, SOLUTION INTRAVENOUS at 09:42

## 2020-10-15 ENCOUNTER — TELEPHONE (OUTPATIENT)
Dept: BARIATRICS/WEIGHT MGMT | Facility: CLINIC | Age: 45
End: 2020-10-15

## 2020-10-15 NOTE — TELEPHONE ENCOUNTER
General pt called in stating that he is having some pain around the area where he had the appendectomy in 2/2020. Pt had a CT scan done in July w/ his pcp (CT in [facility]), and the pain is not getting any better. Pt was wondering if it could be pain from scarred tissue, or where you would think the pain could possible be radiating from. Pt stated that he would like you opinion on this, he said he has discussed with his PCP, but would also like your feedback as well. Please advise, thank you.

## 2023-08-03 ENCOUNTER — OFFICE VISIT (OUTPATIENT)
Dept: ORTHOPEDIC SURGERY | Facility: CLINIC | Age: 48
End: 2023-08-03
Payer: COMMERCIAL

## 2023-08-03 VITALS
DIASTOLIC BLOOD PRESSURE: 60 MMHG | BODY MASS INDEX: 26.23 KG/M2 | HEIGHT: 70 IN | WEIGHT: 183.2 LBS | SYSTOLIC BLOOD PRESSURE: 110 MMHG

## 2023-08-03 DIAGNOSIS — M17.11 PRIMARY OSTEOARTHRITIS OF RIGHT KNEE: ICD-10-CM

## 2023-08-03 DIAGNOSIS — M25.561 LATERAL KNEE PAIN, RIGHT: Primary | ICD-10-CM

## 2023-08-03 DIAGNOSIS — M25.561 RIGHT KNEE PAIN, UNSPECIFIED CHRONICITY: ICD-10-CM

## 2023-08-03 PROCEDURE — 99203 OFFICE O/P NEW LOW 30 MIN: CPT | Performed by: ORTHOPAEDIC SURGERY

## 2023-09-18 ENCOUNTER — HOSPITAL ENCOUNTER (OUTPATIENT)
Dept: MRI IMAGING | Facility: HOSPITAL | Age: 48
Discharge: HOME | End: 2023-09-18
Admitting: ORTHOPAEDIC SURGERY
Payer: COMMERCIAL

## 2023-09-18 DIAGNOSIS — M25.561 LATERAL KNEE PAIN, RIGHT: ICD-10-CM

## 2023-09-18 PROCEDURE — 73721 MRI JNT OF LWR EXTRE W/O DYE: CPT

## 2023-09-21 ENCOUNTER — OFFICE VISIT (OUTPATIENT)
Dept: ORTHOPEDIC SURGERY | Facility: CLINIC | Age: 48
End: 2023-09-21
Payer: COMMERCIAL

## 2023-09-21 VITALS
DIASTOLIC BLOOD PRESSURE: 88 MMHG | SYSTOLIC BLOOD PRESSURE: 126 MMHG | WEIGHT: 185 LBS | BODY MASS INDEX: 26.48 KG/M2 | HEIGHT: 70 IN

## 2023-09-21 DIAGNOSIS — S83.271D COMPLEX TEAR OF LATERAL MENISCUS OF RIGHT KNEE AS CURRENT INJURY, SUBSEQUENT ENCOUNTER: Primary | ICD-10-CM

## 2023-09-21 RX ORDER — MELOXICAM 7.5 MG/1
15 TABLET ORAL ONCE
OUTPATIENT
Start: 2023-09-21 | End: 2023-09-21

## 2023-09-21 RX ORDER — ACETAMINOPHEN 325 MG/1
1000 TABLET ORAL ONCE
OUTPATIENT
Start: 2023-09-21 | End: 2023-09-21

## 2023-09-21 NOTE — PROGRESS NOTES
Orthopaedic Clinic Note: Knee Established Patient    Chief Complaint   Patient presents with    Follow-up     7 week MRI f/u (9/18/23) --Lateral knee pain, right        HPI    It has been 7  week(s) since Mr. Ellison's last visit. He returns to clinic today for bilateral knee pain.  He is here today to discuss an MRI that is performed on 9/18/2023.  He continues to complain of pain that he rates a 5/6 on the 10 point scale.  Localizes the pain to the lateral joint line.  He is having ongoing mechanical sharp pain with certain twisting motions.  Overall he is doing the same.  He is here to discuss his MRI results and treatment options.    Past Medical History:   Diagnosis Date    Ankle sprain various times over years    all treated w'ice/rest    Fracture of wrist right hand, broken wrist. 3 places    17 years old. treated successfully by Kleinert/Lior    Fracture, foot 10 yrs. ago    treated successfully by Dr. Brittany Kitchen    GERD (gastroesophageal reflux disease)     Neck strain C-4/C-5 history of injury    treated with chiropractic adjustments every 3-4 weeks.    Seasonal allergies       Past Surgical History:   Procedure Laterality Date    APPENDECTOMY N/A 2/7/2020    Procedure: APPENDECTOMY LAPAROSCOPIC;  Surgeon: Xenia Hardin MD;  Location: [facility];  Service: General;  Laterality: N/A;    LYMPH NODE BIOPSY      WISDOM TOOTH EXTRACTION        History reviewed. No pertinent family history.  Social History     Socioeconomic History    Marital status:    Tobacco Use    Smoking status: Never    Smokeless tobacco: Never   Vaping Use    Vaping Use: Never used   Substance and Sexual Activity    Alcohol use: Yes     Alcohol/week: 1.0 standard drink     Types: 1 Cans of beer per week     Comment: drink 1-2 beers every 60-90 days    Drug use: Never    Sexual activity: Yes     Partners: Female     Comment:  --Spouse      Current Outpatient Medications on File Prior to Visit   Medication Sig  "Dispense Refill    cetirizine (zyrTEC) 10 MG tablet Take 1 tablet by mouth Daily.       No current facility-administered medications on file prior to visit.      No Known Allergies     Review of Systems   Constitutional: Negative.    HENT: Negative.     Eyes: Negative.    Respiratory: Negative.     Cardiovascular: Negative.    Gastrointestinal: Negative.    Endocrine: Negative.    Genitourinary: Negative.    Musculoskeletal:  Positive for arthralgias.   Skin: Negative.    Allergic/Immunologic: Negative.    Neurological: Negative.    Hematological: Negative.    Psychiatric/Behavioral: Negative.        The patient's Review of Systems was personally reviewed and confirmed as accurate.    Physical Exam  Blood pressure 126/88, height 177.8 cm (70\"), weight 83.9 kg (185 lb).    Body mass index is 26.54 kg/m².    GENERAL APPEARANCE: awake, alert, oriented, in no acute distress and well developed, well nourished  LUNGS:  breathing nonlabored  EXTREMITIES: no clubbing, cyanosis  PERIPHERAL PULSES: palpable dorsalis pedis and posterior tibial pulses bilaterally.    GAIT:  Normal        ----------  Right Knee Exam:  ----------  ALIGNMENT: neutral  ----------  RANGE OF MOTION:  Normal (0-120 degrees) with no extensor lag or flexion contracture  LIGAMENTOUS STABILITY:   stable to varus and valgus stress at terminal extension and 30 degrees without any evidence of laxity  ----------  STRENGTH:  KNEE FLEXION 5/5  KNEE EXTENSION  5/5  ANKLE DORSIFLEXION  5/5  ANKLE PLANTARFLEXION  5/5  ----------  PAIN WITH PALPATION:lateral joint line  KNEE EFFUSION: no  PAIN WITH KNEE ROM: yes  PATELLAR CREPITUS:  no  Painful lateral Danilo's  ----------  SENSATION TO LIGHT TOUCH:  DEEP PERONEAL/SUPERFICIAL PERONEAL/SURAL/SAPHENOUS/TIBIAL:    intact  ----------  EDEMA:  no  ERYTHEMA:    no  WOUNDS/INCISIONS:   " no  _____________________________________________________________________  _____________________________________________________________________    RADIOGRAPHIC FINDINGS:   MRI of the right knee from 9/18/2023 reveals horizontal lateral meniscal tear involving the body and posterior horn of the lateral meniscus with small parameniscal cyst.  No subchondral edema identified.    Assessment/Plan:   Diagnosis Plan   1. Complex tear of lateral meniscus of right knee as current injury, subsequent encounter  Case Request    CBC and Differential    Comprehensive metabolic panel    Protime-INR    ethyl alcohol 62 % 2 each    ceFAZolin (ANCEF) 2 g in sodium chloride 0.9 % 100 mL IVPB    acetaminophen (TYLENOL) tablet 975 mg    meloxicam (MOBIC) tablet 15 mg    Case Request        MRI findings demonstrate lateral meniscus tear which corresponds to the patient's pain.  His pain has been refractory to anti-inflammatory treatment for the past 3 to 4 months.  As a result, I discussed proceeding with knee arthroscopy for partial lateral meniscectomy.  Discussed the risks and benefits of the surgery and patient is agreeable to proceeding.    The patient has clinical and radiographic evidence of right knee lateral meniscus tear which is severely restricting the patient's activities as well as quality of life. The recommendation at this time is to proceed with a right knee diagnostic arthroscopy with partial lateral meniscectomy with the goal to improve patient function, decrease pain, and improve mobility. The risks, benefits, potential complications, and alternatives were discussed with the patient in detail. Risks included but were not limited to bleeding, infection, anesthesia risks, damage to neurovascular structures, recurrent meniscal tear, acceleration of arthritis, pain, continued pain, iatrogenic fracture, possible need for future surgery including the potential for amputation, blood clots, myocardial infarction, stroke, and  death. Sia-operative blood management and the potential for blood transfusion were discussed with risks and options clearly outlined. Specific details of the surgical procedure, hospitalization, recovery, rehabilitation, and long-term precautions were also presented. Pre-operative teaching was provided. Implant/prosthesis and equipment selection was outlined, and the many options available were explained; the final choice will be made at the time of the procedure to match the anatomy and condition of the bone, ligaments, tendons, and muscles. Given this instruction, the patient elected to proceed with the right knee diagnostic arthroscopy with partial lateral meniscectomy. The patient will be seen by pre-admission testing for pre-operative optimization and risk assessment and will be scheduled for surgery once this is completed.        Diogo Alvares MD  09/21/23  09:37 EDT

## 2023-09-27 PROBLEM — S83.271A COMPLEX TEAR OF LATERAL MENISCUS OF RIGHT KNEE AS CURRENT INJURY: Status: ACTIVE | Noted: 2023-09-27

## 2023-10-06 NOTE — ANESTHESIA PROCEDURE NOTES
Airway  Urgency: elective    Airway not difficult    General Information and Staff    Patient location during procedure: OR    Indications and Patient Condition  Indications for airway management: airway protection    Preoxygenated: yes  MILS not maintained throughout  Mask difficulty assessment: 1 - vent by mask    Final Airway Details  Final airway type: endotracheal airway      Successful airway: ETT  Cuffed: yes   Successful intubation technique: direct laryngoscopy  Endotracheal tube insertion site: oral  Blade: Karlie  Blade size: 3  ETT size (mm): 7.5  Cormack-Lehane Classification: grade I - full view of glottis  Placement verified by: chest auscultation and capnometry   Number of attempts at approach: 1  Assessment: lips, teeth, and gum same as pre-op and atraumatic intubation               Alert and oriented to person, place and time

## 2023-11-28 ENCOUNTER — PRE-ADMISSION TESTING (OUTPATIENT)
Dept: PREADMISSION TESTING | Facility: HOSPITAL | Age: 48
End: 2023-11-28
Payer: COMMERCIAL

## 2023-11-28 VITALS — WEIGHT: 192.57 LBS | BODY MASS INDEX: 26.96 KG/M2 | HEIGHT: 71 IN

## 2023-11-28 DIAGNOSIS — S83.271D COMPLEX TEAR OF LATERAL MENISCUS OF RIGHT KNEE AS CURRENT INJURY, SUBSEQUENT ENCOUNTER: ICD-10-CM

## 2023-11-28 LAB
ALBUMIN SERPL-MCNC: 4.3 G/DL (ref 3.5–5.2)
ALBUMIN/GLOB SERPL: 1.2 G/DL
ALP SERPL-CCNC: 160 U/L (ref 39–117)
ALT SERPL W P-5'-P-CCNC: 54 U/L (ref 1–41)
ANION GAP SERPL CALCULATED.3IONS-SCNC: 10 MMOL/L (ref 5–15)
AST SERPL-CCNC: 33 U/L (ref 1–40)
BASOPHILS # BLD AUTO: 0.03 10*3/MM3 (ref 0–0.2)
BASOPHILS NFR BLD AUTO: 0.4 % (ref 0–1.5)
BILIRUB SERPL-MCNC: 0.5 MG/DL (ref 0–1.2)
BUN SERPL-MCNC: 15 MG/DL (ref 6–20)
BUN/CREAT SERPL: 16.3 (ref 7–25)
CALCIUM SPEC-SCNC: 9.3 MG/DL (ref 8.6–10.5)
CHLORIDE SERPL-SCNC: 98 MMOL/L (ref 98–107)
CO2 SERPL-SCNC: 29 MMOL/L (ref 22–29)
CREAT SERPL-MCNC: 0.92 MG/DL (ref 0.76–1.27)
DEPRECATED RDW RBC AUTO: 38.3 FL (ref 37–54)
EGFRCR SERPLBLD CKD-EPI 2021: 102 ML/MIN/1.73
EOSINOPHIL # BLD AUTO: 0.39 10*3/MM3 (ref 0–0.4)
EOSINOPHIL NFR BLD AUTO: 5.5 % (ref 0.3–6.2)
ERYTHROCYTE [DISTWIDTH] IN BLOOD BY AUTOMATED COUNT: 11.9 % (ref 12.3–15.4)
GLOBULIN UR ELPH-MCNC: 3.6 GM/DL
GLUCOSE SERPL-MCNC: 95 MG/DL (ref 65–99)
HBA1C MFR BLD: 5.4 % (ref 4.8–5.6)
HCT VFR BLD AUTO: 47.3 % (ref 37.5–51)
HGB BLD-MCNC: 16 G/DL (ref 13–17.7)
IMM GRANULOCYTES # BLD AUTO: 0.02 10*3/MM3 (ref 0–0.05)
IMM GRANULOCYTES NFR BLD AUTO: 0.3 % (ref 0–0.5)
INR PPP: 1.05 (ref 0.89–1.12)
LYMPHOCYTES # BLD AUTO: 1.27 10*3/MM3 (ref 0.7–3.1)
LYMPHOCYTES NFR BLD AUTO: 17.9 % (ref 19.6–45.3)
MCH RBC QN AUTO: 30 PG (ref 26.6–33)
MCHC RBC AUTO-ENTMCNC: 33.8 G/DL (ref 31.5–35.7)
MCV RBC AUTO: 88.7 FL (ref 79–97)
MONOCYTES # BLD AUTO: 0.49 10*3/MM3 (ref 0.1–0.9)
MONOCYTES NFR BLD AUTO: 6.9 % (ref 5–12)
NEUTROPHILS NFR BLD AUTO: 4.89 10*3/MM3 (ref 1.7–7)
NEUTROPHILS NFR BLD AUTO: 69 % (ref 42.7–76)
NRBC BLD AUTO-RTO: 0 /100 WBC (ref 0–0.2)
PLATELET # BLD AUTO: 206 10*3/MM3 (ref 140–450)
PMV BLD AUTO: 9.2 FL (ref 6–12)
POTASSIUM SERPL-SCNC: 4 MMOL/L (ref 3.5–5.2)
PROT SERPL-MCNC: 7.9 G/DL (ref 6–8.5)
PROTHROMBIN TIME: 13.8 SECONDS (ref 12.2–14.5)
RBC # BLD AUTO: 5.33 10*6/MM3 (ref 4.14–5.8)
SODIUM SERPL-SCNC: 137 MMOL/L (ref 136–145)
WBC NRBC COR # BLD AUTO: 7.09 10*3/MM3 (ref 3.4–10.8)

## 2023-11-28 PROCEDURE — 85610 PROTHROMBIN TIME: CPT

## 2023-11-28 PROCEDURE — 83036 HEMOGLOBIN GLYCOSYLATED A1C: CPT

## 2023-11-28 PROCEDURE — 85025 COMPLETE CBC W/AUTO DIFF WBC: CPT

## 2023-11-28 PROCEDURE — 36415 COLL VENOUS BLD VENIPUNCTURE: CPT

## 2023-11-28 PROCEDURE — 80053 COMPREHEN METABOLIC PANEL: CPT

## 2023-11-28 RX ORDER — LEVOCETIRIZINE DIHYDROCHLORIDE 5 MG/1
5 TABLET, FILM COATED ORAL EVERY EVENING
COMMUNITY

## 2023-11-28 NOTE — PAT
Patient to apply Chlorhexadine wipes  to surgical area (as instructed) the night before procedure and the AM of procedure. Wipes provided.    Patient instructed to drink 20 ounces of Gatorade or Gatorlyte (if diabetic) and it needs to be completed 1 hour (for Main OR patients) or 2 hours (scheduled  section & [facility] patients) before given arrival time for procedure (NO RED Gatorade and NO Gatorade Zero).    Patient verbalized understanding.    Patient viewed general PAT education video as instructed in their preoperative information received from their surgeon.  Patient stated the general PAT education video was viewed in its entirety and survey completed.  Copies of PAT general education handouts (Incentive Spirometry, Meds to Beds Program, Patient Belongings, Pre-op skin preparation instructions, Blood Glucose testing, Visitor policy, Surgery FAQ, Code H) distributed to patient if not printed. Education related to the PAT pass and skin preparation for surgery (if applicable) completed in PAT as a reinforcement to PAT education video. Patient instructed to return PAT pass provided today as well as completed skin preparation sheet (if applicable) on the day of procedure.     Additionally if patient had not viewed video yet but intended to view it at home or in our waiting area, then referred them to the handout with QR code/link provided during PAT visit.  Instructed patient to complete survey after viewing the video in its entirety.  Encouraged patient/family to read PAT general education handouts thoroughly and notify PAT staff with any questions or concerns. Patient verbalized understanding of all information and priority content.    InfuBLOCK (by [employer]) pain pump patient informational handout given to patient.  Instructed patient to watch [employer] Patient Education Video regarding Peripheral Nerve Catheter that will be in place for upcoming surgery unless contraindicated. The video can be  accessed using QR code noted on handout.  Patient agreed to watch video.  Stressed to patient to call [facility] Nursing Hotline 24/7 if patient has any questions or concerns after discharge.      Post-Surgery Information Instruction Sheet given to patient during Pre-Admission Testing Visit with verbal instructions to patient to return with PAT PASS on the day of surgery. Additionally, encouraged patient to review the information provided.

## 2023-12-04 ENCOUNTER — ANESTHESIA EVENT (OUTPATIENT)
Dept: PERIOP | Facility: HOSPITAL | Age: 48
End: 2023-12-04
Payer: COMMERCIAL

## 2023-12-04 ENCOUNTER — HOSPITAL ENCOUNTER (OUTPATIENT)
Facility: HOSPITAL | Age: 48
Setting detail: HOSPITAL OUTPATIENT SURGERY
Discharge: HOME | End: 2023-12-04
Attending: ORTHOPAEDIC SURGERY | Admitting: ORTHOPAEDIC SURGERY
Payer: COMMERCIAL

## 2023-12-04 ENCOUNTER — ANESTHESIA (OUTPATIENT)
Dept: PERIOP | Facility: HOSPITAL | Age: 48
End: 2023-12-04
Payer: COMMERCIAL

## 2023-12-04 VITALS
TEMPERATURE: 97.2 F | DIASTOLIC BLOOD PRESSURE: 84 MMHG | WEIGHT: 192 LBS | SYSTOLIC BLOOD PRESSURE: 113 MMHG | RESPIRATION RATE: 18 BRPM | HEART RATE: 53 BPM | OXYGEN SATURATION: 97 % | HEIGHT: 71 IN | BODY MASS INDEX: 26.88 KG/M2

## 2023-12-04 DIAGNOSIS — S83.271D COMPLEX TEAR OF LATERAL MENISCUS OF RIGHT KNEE AS CURRENT INJURY, SUBSEQUENT ENCOUNTER: ICD-10-CM

## 2023-12-04 DIAGNOSIS — S83.271A COMPLEX TEAR OF LATERAL MENISCUS OF RIGHT KNEE AS CURRENT INJURY, INITIAL ENCOUNTER: Primary | ICD-10-CM

## 2023-12-04 PROCEDURE — 29881 ARTHRS KNE SRG MNISECTMY M/L: CPT | Performed by: ORTHOPAEDIC SURGERY

## 2023-12-04 PROCEDURE — 25810000003 LACTATED RINGERS PER 1000 ML: Performed by: ANESTHESIOLOGY

## 2023-12-04 PROCEDURE — 25010000002 ONDANSETRON PER 1 MG: Performed by: NURSE ANESTHETIST, CERTIFIED REGISTERED

## 2023-12-04 PROCEDURE — 25010000002 CEFAZOLIN PER 500 MG: Performed by: ORTHOPAEDIC SURGERY

## 2023-12-04 PROCEDURE — 25010000002 DEXAMETHASONE PER 1 MG: Performed by: NURSE ANESTHETIST, CERTIFIED REGISTERED

## 2023-12-04 PROCEDURE — 25010000002 PROPOFOL 10 MG/ML EMULSION: Performed by: NURSE ANESTHETIST, CERTIFIED REGISTERED

## 2023-12-04 PROCEDURE — 25010000002 FENTANYL CITRATE (PF) 100 MCG/2ML SOLUTION: Performed by: NURSE ANESTHETIST, CERTIFIED REGISTERED

## 2023-12-04 RX ORDER — ONDANSETRON 2 MG/ML
4 INJECTION INTRAMUSCULAR; INTRAVENOUS ONCE AS NEEDED
Status: DISCONTINUED | OUTPATIENT
Start: 2023-12-04 | End: 2023-12-04 | Stop reason: HOSPADM

## 2023-12-04 RX ORDER — ONDANSETRON 2 MG/ML
INJECTION INTRAMUSCULAR; INTRAVENOUS AS NEEDED
Status: DISCONTINUED | OUTPATIENT
Start: 2023-12-04 | End: 2023-12-04 | Stop reason: SURG

## 2023-12-04 RX ORDER — LIDOCAINE HYDROCHLORIDE 10 MG/ML
INJECTION, SOLUTION EPIDURAL; INFILTRATION; INTRACAUDAL; PERINEURAL AS NEEDED
Status: DISCONTINUED | OUTPATIENT
Start: 2023-12-04 | End: 2023-12-04 | Stop reason: SURG

## 2023-12-04 RX ORDER — SODIUM CHLORIDE 0.9 % (FLUSH) 0.9 %
10 SYRINGE (ML) INJECTION EVERY 12 HOURS SCHEDULED
Status: CANCELLED | OUTPATIENT
Start: 2023-12-04

## 2023-12-04 RX ORDER — HYDROMORPHONE HYDROCHLORIDE 1 MG/ML
0.5 INJECTION, SOLUTION INTRAMUSCULAR; INTRAVENOUS; SUBCUTANEOUS
Status: DISCONTINUED | OUTPATIENT
Start: 2023-12-04 | End: 2023-12-04 | Stop reason: HOSPADM

## 2023-12-04 RX ORDER — DOCUSATE SODIUM 100 MG/1
100 CAPSULE, LIQUID FILLED ORAL 2 TIMES DAILY PRN
Qty: 60 CAPSULE | Refills: 1 | Status: SHIPPED | OUTPATIENT
Start: 2023-12-04

## 2023-12-04 RX ORDER — SODIUM CHLORIDE 0.9 % (FLUSH) 0.9 %
10 SYRINGE (ML) INJECTION AS NEEDED
Status: CANCELLED | OUTPATIENT
Start: 2023-12-04

## 2023-12-04 RX ORDER — HYDROCODONE BITARTRATE AND ACETAMINOPHEN 5; 325 MG/1; MG/1
1-2 TABLET ORAL EVERY 4 HOURS PRN
Qty: 40 TABLET | Refills: 0 | Status: SHIPPED | OUTPATIENT
Start: 2023-12-04

## 2023-12-04 RX ORDER — BUPIVACAINE HYDROCHLORIDE AND EPINEPHRINE 2.5; 5 MG/ML; UG/ML
INJECTION, SOLUTION EPIDURAL; INFILTRATION; INTRACAUDAL; PERINEURAL AS NEEDED
Status: DISCONTINUED | OUTPATIENT
Start: 2023-12-04 | End: 2023-12-04 | Stop reason: HOSPADM

## 2023-12-04 RX ORDER — PROPOFOL 10 MG/ML
VIAL (ML) INTRAVENOUS AS NEEDED
Status: DISCONTINUED | OUTPATIENT
Start: 2023-12-04 | End: 2023-12-04 | Stop reason: SURG

## 2023-12-04 RX ORDER — FENTANYL CITRATE 50 UG/ML
INJECTION, SOLUTION INTRAMUSCULAR; INTRAVENOUS AS NEEDED
Status: DISCONTINUED | OUTPATIENT
Start: 2023-12-04 | End: 2023-12-04 | Stop reason: SURG

## 2023-12-04 RX ORDER — NALOXONE HYDROCHLORIDE 4 MG/.1ML
SPRAY NASAL
Qty: 2 EACH | Refills: 0 | Status: SHIPPED | OUTPATIENT
Start: 2023-12-04

## 2023-12-04 RX ORDER — MIDAZOLAM HYDROCHLORIDE 1 MG/ML
1 INJECTION INTRAMUSCULAR; INTRAVENOUS
Status: DISCONTINUED | OUTPATIENT
Start: 2023-12-04 | End: 2023-12-04 | Stop reason: HOSPADM

## 2023-12-04 RX ORDER — FAMOTIDINE 20 MG/1
20 TABLET, FILM COATED ORAL ONCE
Status: COMPLETED | OUTPATIENT
Start: 2023-12-04 | End: 2023-12-04

## 2023-12-04 RX ORDER — MAGNESIUM HYDROXIDE 1200 MG/15ML
LIQUID ORAL AS NEEDED
Status: DISCONTINUED | OUTPATIENT
Start: 2023-12-04 | End: 2023-12-04 | Stop reason: HOSPADM

## 2023-12-04 RX ORDER — SODIUM CHLORIDE, SODIUM LACTATE, POTASSIUM CHLORIDE, CALCIUM CHLORIDE 600; 310; 30; 20 MG/100ML; MG/100ML; MG/100ML; MG/100ML
9 INJECTION, SOLUTION INTRAVENOUS CONTINUOUS
Status: DISCONTINUED | OUTPATIENT
Start: 2023-12-04 | End: 2023-12-04 | Stop reason: HOSPADM

## 2023-12-04 RX ORDER — ACETAMINOPHEN 500 MG
1000 TABLET ORAL ONCE
Status: COMPLETED | OUTPATIENT
Start: 2023-12-04 | End: 2023-12-04

## 2023-12-04 RX ORDER — SODIUM CHLORIDE 9 MG/ML
40 INJECTION, SOLUTION INTRAVENOUS AS NEEDED
Status: CANCELLED | OUTPATIENT
Start: 2023-12-04

## 2023-12-04 RX ORDER — LIDOCAINE HYDROCHLORIDE 10 MG/ML
0.5 INJECTION, SOLUTION EPIDURAL; INFILTRATION; INTRACAUDAL; PERINEURAL ONCE AS NEEDED
Status: COMPLETED | OUTPATIENT
Start: 2023-12-04 | End: 2023-12-04

## 2023-12-04 RX ORDER — DEXAMETHASONE SODIUM PHOSPHATE 4 MG/ML
INJECTION, SOLUTION INTRA-ARTICULAR; INTRALESIONAL; INTRAMUSCULAR; INTRAVENOUS; SOFT TISSUE AS NEEDED
Status: DISCONTINUED | OUTPATIENT
Start: 2023-12-04 | End: 2023-12-04 | Stop reason: SURG

## 2023-12-04 RX ORDER — FAMOTIDINE 10 MG/ML
20 INJECTION, SOLUTION INTRAVENOUS ONCE
Status: CANCELLED | OUTPATIENT
Start: 2023-12-04 | End: 2023-12-04

## 2023-12-04 RX ORDER — MELOXICAM 15 MG/1
15 TABLET ORAL ONCE
Status: COMPLETED | OUTPATIENT
Start: 2023-12-04 | End: 2023-12-04

## 2023-12-04 RX ORDER — FENTANYL CITRATE 50 UG/ML
50 INJECTION, SOLUTION INTRAMUSCULAR; INTRAVENOUS
Status: DISCONTINUED | OUTPATIENT
Start: 2023-12-04 | End: 2023-12-04 | Stop reason: HOSPADM

## 2023-12-04 RX ADMIN — PROPOFOL 200 MG: 10 INJECTION, EMULSION INTRAVENOUS at 14:32

## 2023-12-04 RX ADMIN — LIDOCAINE HYDROCHLORIDE 50 MG: 10 INJECTION, SOLUTION EPIDURAL; INFILTRATION; INTRACAUDAL; PERINEURAL at 14:32

## 2023-12-04 RX ADMIN — FENTANYL CITRATE 100 MCG: 50 INJECTION, SOLUTION INTRAMUSCULAR; INTRAVENOUS at 14:32

## 2023-12-04 RX ADMIN — ACETAMINOPHEN 1000 MG: 500 TABLET ORAL at 13:39

## 2023-12-04 RX ADMIN — DEXAMETHASONE SODIUM PHOSPHATE 4 MG: 4 INJECTION, SOLUTION INTRAMUSCULAR; INTRAVENOUS at 14:36

## 2023-12-04 RX ADMIN — MELOXICAM 15 MG: 15 TABLET ORAL at 13:40

## 2023-12-04 RX ADMIN — SODIUM CHLORIDE 2 G: 900 INJECTION INTRAVENOUS at 14:36

## 2023-12-04 RX ADMIN — SODIUM CHLORIDE, POTASSIUM CHLORIDE, SODIUM LACTATE AND CALCIUM CHLORIDE 9 ML/HR: 600; 310; 30; 20 INJECTION, SOLUTION INTRAVENOUS at 13:20

## 2023-12-04 RX ADMIN — LIDOCAINE HYDROCHLORIDE 0.2 ML: 10 INJECTION, SOLUTION EPIDURAL; INFILTRATION; INTRACAUDAL; PERINEURAL at 13:20

## 2023-12-04 RX ADMIN — FAMOTIDINE 20 MG: 20 TABLET ORAL at 13:40

## 2023-12-04 RX ADMIN — ONDANSETRON 4 MG: 2 INJECTION INTRAMUSCULAR; INTRAVENOUS at 14:59

## 2023-12-04 NOTE — ANESTHESIA PREPROCEDURE EVALUATION
Anesthesia Evaluation     Patient summary reviewed and Nursing notes reviewed   no history of anesthetic complications:   NPO Solid Status: > 8 hours  NPO Liquid Status: > 8 hours           Airway   Mallampati: II  TM distance: >3 FB  No difficulty expected  Dental      Pulmonary - negative pulmonary ROS and normal exam   Cardiovascular - negative cardio ROS and normal exam        Neuro/Psych- negative ROS  GI/Hepatic/Renal/Endo    (+) GERD    Musculoskeletal     Abdominal    Substance History      OB/GYN          Other                    Anesthesia Plan    ASA 2     general     intravenous induction     Anesthetic plan, risks, benefits, and alternatives have been provided, discussed and informed consent has been obtained with: patient.    Plan discussed with CRNA.    CODE STATUS:

## 2023-12-04 NOTE — ANESTHESIA PROCEDURE NOTES
Airway  Urgency: elective    Date/Time: 12/4/2023 2:33 PM  Airway not difficult    General Information and Staff    Patient location during procedure: OR  SRNA: Ninoska Merino SRNA  Indications and Patient Condition  Indications for airway management: airway protection    Preoxygenated: yes  Mask difficulty assessment: 1 - vent by mask    Final Airway Details  Final airway type: supraglottic airway      Successful airway: I-gel  Size 4     Number of attempts at approach: 1  Assessment: lips, teeth, and gum same as pre-op    Additional Comments  LMA placed without difficulty, ventilation with assist, equal breath sounds and symmetric chest rise and fall

## 2023-12-04 NOTE — ANESTHESIA POSTPROCEDURE EVALUATION
Patient: Grace Ellison    Procedure Summary       Date: 12/04/23 Room / Location:  [facility] /  [facility]    Anesthesia Start: 1430 Anesthesia Stop: 1514    Procedure: KNEE ARTHROSCOPY WITH PARTIAL LATERAL MENISCECTOMY - RIGHT (Right: Knee) Diagnosis:       Complex tear of lateral meniscus of right knee as current injury, subsequent encounter      (Complex tear of lateral meniscus of right knee as current injury, subsequent encounter [S83.663D])    Surgeons: Diogo Alvares MD Provider: Thee Canales MD    Anesthesia Type: general ASA Status: 2            Anesthesia Type: general    Vitals  No vitals data found for the desired time range.          Post Anesthesia Care and Evaluation    Patient location during evaluation: PACU  Patient participation: complete - patient participated  Level of consciousness: awake and alert  Pain management: adequate    Airway patency: patent  Anesthetic complications: No anesthetic complications  PONV Status: none  Cardiovascular status: hemodynamically stable and acceptable  Respiratory status: nonlabored ventilation, acceptable and room air  Hydration status: acceptable

## 2023-12-04 NOTE — H&P
Pre-Op H&P  Grace Ellison  [id number]  1975      Chief complaint: Right knee pain      Subjective:  Patient is a 48 y.o.male presents for scheduled surgery by Dr. Alvares. He anticipates a KNEE ARTHROSCOPY WITH PARTIAL LATERAL MENISCECTOMY - RIGHT  today. His knee has been painful for about 6 months. The pain is worse with running and better with rest. MRI findings demonstrate lateral meniscus tear.      Review of Systems:  Constitutional-- No fever, chills or sweats. No fatigue.  CV-- No chest pain, palpitation or syncope  Resp-- No SOB, cough, hemoptysis  Skin--No rashes or lesions      Allergies: No Known Allergies      Home Meds:  Medications Prior to Admission   Medication Sig Dispense Refill Last Dose    levocetirizine (XYZAL) 5 MG tablet Take 1 tablet by mouth Every Evening.   11/27/2023         PMH:   Past Medical History:   Diagnosis Date    Ankle sprain various times over years    all treated w'ice/rest    Arthritis     Fracture of wrist right hand, broken wrist. 3 places    17 years old. treated successfully by Kleinert/[facility]ior    Fracture, foot 10 yrs. ago    treated successfully by Dr. Brittany Kitchen    Neck strain C-4/C-5 history of injury    treated with chiropractic adjustments every 3-4 weeks.    Seasonal allergies      PSH:    Past Surgical History:   Procedure Laterality Date    APPENDECTOMY N/A 02/07/2020    Procedure: APPENDECTOMY LAPAROSCOPIC;  Surgeon: Xenia Hardin MD;  Location: [facility] [place];  Service: General;  Laterality: N/A;    BICEPS TENDON REPAIR Right     COLONOSCOPY      up to date    LASIK Bilateral     LYMPH NODE BIOPSY      WISDOM TOOTH EXTRACTION       Social History:   Tobacco:   Social History     Tobacco Use   Smoking Status Never   Smokeless Tobacco Never      Alcohol:     Social History     Substance and Sexual Activity   Alcohol Use Yes    Alcohol/week: 1.0 standard drink of alcohol    Types: 1 Cans of beer per week    Comment: drink 1-2 beers every 60-90  "days         Physical Exam:/91 (BP Location: Right arm, Patient Position: Sitting)   Pulse 78   Temp 98 °F (36.7 °C) (Temporal)   Resp 18   Ht 180.3 cm (71\")   Wt 87.1 kg (192 lb)   SpO2 94%   BMI 26.78 kg/m²       General Appearance:    Alert, cooperative, no distress, appears stated age   Head:    Normocephalic, without obvious abnormality, atraumatic   Lungs:     Clear to auscultation bilaterally, respirations unlabored    Heart:   Regular rate and rhythm, S1 and S2 normal    Abdomen:    Soft without tenderness   Extremities:   Extremities normal, atraumatic, no cyanosis or edema   Skin:   Skin color, texture, turgor normal, no rashes or lesions   Neurologic:   Grossly intact     Results Review:     LABS:  Lab Results   Component Value Date    WBC 7.09 11/28/2023    HGB 16.0 11/28/2023    HCT 47.3 11/28/2023    MCV 88.7 11/28/2023     11/28/2023    NEUTROABS 4.89 11/28/2023    GLUCOSE 95 11/28/2023    BUN 15 11/28/2023    CREATININE 0.92 11/28/2023    EGFRIFNONA 88 02/07/2020     11/28/2023    K 4.0 11/28/2023    CL 98 11/28/2023    CO2 29.0 11/28/2023    CALCIUM 9.3 11/28/2023    ALBUMIN 4.3 11/28/2023    AST 33 11/28/2023    ALT 54 (H) 11/28/2023    BILITOT 0.5 11/28/2023       RADIOLOGY:  Imaging Results (Last 72 Hours)       ** No results found for the last 72 hours. **            I reviewed the patient's new clinical results.    Cancer Staging (if applicable)  Cancer Patient: __ yes __no __unknown; If yes, clinical stage T:__ N:__M:__, stage group or __N/A      Impression: Complex tear of lateral meniscus of right knee       Plan: KNEE ARTHROSCOPY WITH PARTIAL LATERAL MENISCECTOMY - RIGHT       Magdalena Cm, TIFFANY   12/4/2023   13:34 EST  "

## 2023-12-04 NOTE — DISCHARGE INSTRUCTIONS
"DISCHARGE INSTRUCTIONS   Dr. Alvares     Right KNEE ARTHROSCOPY     Wound Care   1) Keep wound / incision area clean and dry.   2) Dressing to remain in place until post-operative day 3. Upon dressing removal, assess for wound drainage. If no drainage is present, keep wound / incision area open to air as much as possible. If drainage is present, place sterile dressing to cover wound and assess daily. If drainage continues to occur after post-operative day 10 call the office for an urgent appointment. (You should be seen in the clinic within 1-2 days of calling).   3) No baths or swimming until otherwise instructed. The wound must remain dry for 6 days after surgery. After 6 days, you may begin to shower only if no drainage is present. No submerging the wound under standing water until cleared by your physician (no baths, hot tubs, swimming pools, etc). Sponge baths are the best way to perform personal hygiene while at the same time protecting the wound from moisture.   4) Prior to showering, the wound must remain dry for 72 consecutive hours (no drainage whatsoever) prior to showering. If the wound drains or spots, the clock \"resets\" - make sure the wound has been drainage-free for 72 consecutive hours.   5) Once you are allowed to get the wound wet, please use gentle soap to wash the wound area. DO NOT aggressively scrub the wound with a washcloth or bath sponge. Please visually inspect your wound(s) at least once daily. If the wound(s) are in a difficult to see location, please use a mirror or have someone else assist with visual inspection.   6) No scrubbing the wound. You may \"pad dry\" the wound, but do not rub, as this may open up the wound and pre-dispose to wound infection.   7) Do not apply lotions or creams to incision site, unless instructed otherwise.   8) Observe for redness, swelling, or drainage. Please call the clinic immediately if you have fevers, chills with warmth/redness surrounding wound site or " "if you notice pus drainage from the wound site     Activity   No heavy lifting objects greater than 10 pounds.   No driving while on narcotic pain medication.   No submerging wound under standing water (pool, bath tub, etc.) until otherwise instructed.   You may be weight bearing as tolerated on your operative (right lower) extremity   Use crutches or a walker for ambulation as instructed.  Knee range of motion as tolerated.    Blood Clot Prophylaxis   No blood clot prophylaxis is required other than daily ambulation.     Discharge Pain Medications   You will be given a prescription for pain medication. You should start taking this the same day after your surgery. Wean off as tolerated. Do not wait to take the pain medication until the pain is severe, as it will be difficult to \"catch up\" once this occurs. The pain medication usually reaches its full effect ~1 hour after ingesting. If you have been sent home on Valium, this medication works well for muscle spasms. If you have been sent home on Colace, this medication should be taken until you are off all narcotic (i.e. Norco, Percocet, Oxycodone, etc) pain medications, in order to prevent constipation. Percocet or Norco have Tylenol in their ingredient lists. You must be careful not to exceed 4,000mg (4 grams) of Tylenol, from all sources, within a single 24-hr period. This means that you may not take more than 12 Percocets or Norcos within a 24-hr period. Do NOT take Regular or Extra Strength Tylenol when taking your Percocet or Norco medications. Some common side effects of the narcotic pain medications (Percocet, Oxycodone, Norco, etc) include nausea and itching. Benadryl is a great over the counter medication that helps calm your stomach, decreases your anxiety levels, and minimizes the itching. You can easily purchase this at your local pharmacy as an over-the-counter medication. Please abide by the instructions as printed on the bottle. If your nausea " persists, make sure to take small amounts of crackers or other lighter foods.     Follow-Up   Follow-up with Dr. Alvares's office in 2 weeks from the surgery date for a post-operative evaluation. Please call Dr. Alvares's office at [phone] for orthopaedic appointments or questions.

## 2023-12-04 NOTE — OP NOTE
OPERATIVE REPORT     DATE OF PROCEDURE: 12/4/2023    SURGEON: Diogo Alvares M.D.     ASSISTANT(S): Circulator: Lenore Hathaway RN; Harry Cline RN  Scrub Person: Jorge Douglas  Nursing Assistant: Xenia Terry    PREOPERATIVE DIAGNOSIS: Right knee lateral meniscus tear    POSTOPERATIVE DIAGNOSIS: same     PROCEDURE: Right knee diagnostic arthroscopy with partial lateral meniscectomy    SURGICAL DETAILS:     APPROACH: Standard arthroscopic anteromedial and anterolateral portals     ANESTHESIA: General LMA plus local    PREOPERATIVE ANTIBIOTICS: Ancef 2 g IV    ESTIMATED BLOOD LOSS: 5 cc     TOURNIQUET TIME: 11 min @300 mmHg     SPECIMENS: None    IMPLANTS: None    DRAINS: None    LOCAL INJECTION: 0.25% Marcaine with epinephrine     MODIFIER(S): None    COMPLICATIONS: None apparent    INDICATIONS FOR PROCEDURE: Patient is a 48-year-old male who presented to clinic complaining of lateral based knee pain that was refractory to conservative interventions.  MRI confirmed lateral meniscus tear with associated arthritic changes.  We discussed treatment options and elected to proceed with knee arthroscopy for partial meniscectomy.  The risks, benefits, alternatives, and potential complications of surgery were discussed with the patient in detail to include but not limited to infection, bleeding, anesthesia risks, nerve injury, vessel injury, pain, blood clots, possible need for blood transfusion, possible need for further procedures, myocardial infarction, stroke, and death.  Specific risk for surgery include recurrent meniscal tear, acceleration of arthritis. Specific details of the procedure, hospitalization, recovery, rehabilitation, and long-term precautions were also provided. Pre-operative teaching was provided. Surgical device selection was outlined, and the many options available were explained; final choices will be made at the time of the procedure to match the anatomy and condition of the bone, and soft  tissue structures. Understanding of all topics was conveyed to me by the patient, and consent was given to proceed with a right knee diagnostic arthroscopy with partial lateral meniscectomy.     INTRAOPERATIVE FINDINGS:  Lateral compartment: Diffuse grade 3 changes involving lateral femoral condyle and tibial plateau with complex lateral meniscus tear involving the posterior third of meniscal body  Medial Compartment: Grade 1 changes involving medial femoral condyle and medial tibial plateau  Patellofemoral Compartment: Diffuse grade 2 changes involving patellar facets and trochlea  Cruciate Ligaments: Intact ACL and PCL to probe  Synovium: No significant synovitis, symptomatic adhesions, or plica  Other: No loose bodies    PROCEDURE: The patient was identified in the preoperative holding area. The operative site was confirmed and marked. A sequential compression device was placed on the nonoperative leg. The risks, benefits, and alternatives to surgery were again confirmed with the patient and the patient wished to proceed. The patient was brought to the operating room and placed on the operating room table in the supine position. All bony prominences were padded. A huddle was performed with the patient and all vital surgical team members to confirm the correct operative site, procedure, anesthesia type, and operative plan with the patient. After anesthesia was performed, a full knee exam was performed on the operative leg. Range of motion was 0 to 130 degrees. The knee was stable to varus/valgus stress at terminal extension, 30 degrees flexion and 90 degress flexion.  Negative Lachman's, anterior and posterior drawer. Intravenous antibiotic prophylaxis was given and confirmed with the anesthesia team. A nonsterile tourniquet was applied to the upper thigh of the operative leg and the operative leg was secured in the arthroscopic leg bhagat. The contralateral leg was placed in a flexed and abducted position and  secured in the well leg bhagat. The foot of the OR table was then dropped to allow the operative leg to hang freely.     The operative leg was then prepped and draped in usual sterile fashion while the surgeon scrubbed and donned sterile gown and gloves. A surgical time out was performed immediately preceding the incision with all personnel in the operating room to confirm patient identity, the correct operative site and extremity, correct radiographic studies, availability of appropriate surgical equipment and agreement on the planned procedure. Attention was then turned to the operative knee, which was exanguinated with an esmarch and the tourniquet was inflated. A standard anterolateral portal was created using an 11 blade. A blunt trocar was then placed through the knee capsule into the intracondylar notch. The trocar was then removed followed by placement of the arthroscopic camera. Next, the anteromedial portal was created just above the meniscocapsular junction under direct visualization, taking care to avoid injury to the meniscus. A diagnostic arthroscopy then proceeded with the above recorded findings. A probe was utilized during the diagnostic arthroscopy to further define the pathology. Once the diagnostic portion was complete, attention was turned to addressing the intra-articular pathology. Utilizing an arthroscopic shaver, the unstable lateral meniscus tear was debrided to a stable base. Stability was verified using a probe. Once this was completed, a final exploration of the joint was performed. Any loose meniscal fragments were removed. The joint was then flushed thoroughly with arthroscopic fluid, followed by removal of any excess arthroscopic fluid from the knee. The portals were then closed using a #3-0 nylon stitch. The intra-articular Marcaine injection was then completed. Steri-Strips and sterile dressings were then applied to the operative knee. The patient was then awakened from anesthesia  and transferred to the PACU in stable condition.     No apparent complications occurred during the procedure. Instrument, sponge and needle counts were correct x 2.     POST OPERATIVE PLAN:   Weight Bearing: Weight bearing as tolerated with knee ROM as tolerated.   Therapy/Activity: Ambulate with crutches and wean from crutches as tolerated.  Ice knee for edema control.   Wound Care: Dressing to remain in place until POD#3.  May remove after POD#3 and leave open to air if no drainage.  May shower on POD#6 provided no wound drainage has occurred the previous 72 hours.   Pain control: Norco 5  Follow up: Dr. Alvares 2 weeks.

## 2023-12-04 NOTE — BRIEF OP NOTE
KNEE ARTHROSCOPY WITH MENISCECTOMY  Progress Note    Grace Ellison  12/4/2023    Pre-op Diagnosis:   Complex tear of lateral meniscus of right knee as current injury, subsequent encounter [S83.271D]       Post-Op Diagnosis Codes:     * Complex tear of lateral meniscus of right knee as current injury, subsequent encounter [S83.271D]    Procedure/CPT® Codes:  NE ARTHRS KNE SURG W/MENISCECTOMY MED/LAT W/SHVG [59971]      Procedure(s):  KNEE ARTHROSCOPY WITH PARTIAL LATERAL MENISCECTOMY - RIGHT          Surgeon(s):  Diogo Alvares MD    Anesthesia: General    Staff:   Circulator: Lenore Hathaway RN; Harry Cline RN  Scrub Person: Jorge Douglas  Nursing Assistant: Xenia Terry         Estimated Blood Loss: minimal    Urine Voided: * No values recorded between 12/4/2023  2:29 PM and 12/4/2023  3:04 PM *    Specimens:                None          Drains: * No LDAs found *    Findings: Grade 2 and 3 lateral compartment arthritis with complex lateral meniscus tear        Complications: None apparent          Diogo Alvares MD     Date: 12/4/2023  Time: 15:04 EST

## 2023-12-19 ENCOUNTER — OFFICE VISIT (OUTPATIENT)
Dept: ORTHOPEDIC SURGERY | Facility: CLINIC | Age: 48
End: 2023-12-19
Payer: COMMERCIAL

## 2023-12-19 DIAGNOSIS — Z98.890 S/P RIGHT KNEE ARTHROSCOPY: Primary | ICD-10-CM

## 2023-12-19 DIAGNOSIS — S83.271D COMPLEX TEAR OF LATERAL MENISCUS OF RIGHT KNEE AS CURRENT INJURY, SUBSEQUENT ENCOUNTER: ICD-10-CM

## 2023-12-19 DIAGNOSIS — M17.11 PRIMARY OSTEOARTHRITIS OF RIGHT KNEE: ICD-10-CM

## 2023-12-19 RX ORDER — MELOXICAM 15 MG/1
TABLET ORAL
Qty: 30 TABLET | Refills: 1 | Status: SHIPPED | OUTPATIENT
Start: 2023-12-19

## 2023-12-19 RX ORDER — AZITHROMYCIN 250 MG/1
TABLET, FILM COATED ORAL
COMMUNITY
Start: 2023-12-16

## 2024-01-16 ENCOUNTER — OFFICE VISIT (OUTPATIENT)
Dept: ORTHOPEDIC SURGERY | Facility: CLINIC | Age: 49
End: 2024-01-16
Payer: COMMERCIAL

## 2024-01-16 DIAGNOSIS — Z98.890 S/P RIGHT KNEE ARTHROSCOPY: Primary | ICD-10-CM

## 2024-01-16 PROCEDURE — 99024 POSTOP FOLLOW-UP VISIT: CPT | Performed by: ORTHOPAEDIC SURGERY

## 2024-01-16 NOTE — PROGRESS NOTES
Orthopaedic Clinic Note:  Post Op    Chief Complaint   Patient presents with    Post-op     1 month follow up -- 6 weeks status post RIGHT KNEE ARTHROSCOPY WITH PARTIAL LATERAL MENISCECTOMY (DOS 12/4/23)        HPI    Mr. Ellison is 6  week(s) s/p right knee arthroscopy with partial lateral meniscectomy.  Rates his pain 1/10 on the pain scale due to occasional stiffness.  He is ambulating with no assistive device.  Denies fevers chills or constitutional symptoms.  Overall he is happy with his outcome.    Past Medical History:   Diagnosis Date    Ankle sprain various times over years    all treated w'ice/rest    Arthritis     Fracture of wrist right hand, broken wrist. 3 places    17 years old. treated successfully by [facility]ert/[facility]    Fracture, foot 10 yrs. ago    treated successfully by Dr. Brittany Kitchen    Neck strain C-4/C-5 history of injury    treated with chiropractic adjustments every 3-4 weeks.    Seasonal allergies       Past Surgical History:   Procedure Laterality Date    APPENDECTOMY N/A 02/07/2020    Procedure: APPENDECTOMY LAPAROSCOPIC;  Surgeon: Xenia Hardin MD;  Location:  [facility];  Service: General;  Laterality: N/A;    BICEPS TENDON REPAIR Right     COLONOSCOPY      up to date    KNEE ARTHROSCOPY W/ MENISCECTOMY Right 12/4/2023    Procedure: KNEE ARTHROSCOPY WITH PARTIAL LATERAL MENISCECTOMY - RIGHT;  Surgeon: Diogo Alvares MD;  Location:  [facility];  Service: Orthopedics;  Laterality: Right;    LASIK Bilateral     LYMPH NODE BIOPSY      WISDOM TOOTH EXTRACTION        Social History     Socioeconomic History    Marital status:    Tobacco Use    Smoking status: Never    Smokeless tobacco: Never    Tobacco comments:     never, no history   Vaping Use    Vaping Use: Never used   Substance and Sexual Activity    Alcohol use: Yes     Alcohol/week: 1.0 standard drink of alcohol     Types: 1 Cans of beer per week     Comment: drink 1-2 beers every 60-90 days    Drug use: Never     Sexual activity: Yes     Partners: Female     Comment:  --Spouse      Current Outpatient Medications on File Prior to Visit   Medication Sig Dispense Refill    levocetirizine (XYZAL) 5 MG tablet Take 1 tablet by mouth Every Evening.      meloxicam (MOBIC) 15 MG tablet 1 PO Daily with food.  Stop if you have upset stomach/stomach irritation. 30 tablet 1    [DISCONTINUED] azithromycin (ZITHROMAX) 250 MG tablet       [DISCONTINUED] docusate sodium (COLACE) 100 MG capsule Take 1 capsule by mouth 2 (Two) Times a Day As Needed for Constipation. 60 capsule 1    [DISCONTINUED] HYDROcodone-acetaminophen (NORCO) 5-325 MG per tablet Take 1-2 tablet(s) by mouth Every 4 (Four) Hours As Needed for Pain. 40 tablet 0    [DISCONTINUED] naloxone (NARCAN) 4 MG/0.1ML nasal spray Call 911. Don't prime. Spray in 1 nostril as needed for overdose. Repeat in 2-3 minutes in other nostril if no or minimal breathing/responsiveness. 2 each 0     No current facility-administered medications on file prior to visit.      No Known Allergies     Review of Systems   Constitutional: Negative.    HENT: Negative.     Eyes: Negative.    Respiratory: Negative.     Cardiovascular: Negative.    Gastrointestinal: Negative.    Endocrine: Negative.    Genitourinary: Negative.    Musculoskeletal:  Positive for arthralgias.   Skin: Negative.    Allergic/Immunologic: Negative.    Neurological: Negative.    Hematological: Negative.    Psychiatric/Behavioral: Negative.          Physical Exam  There were no vitals taken for this visit.    There is no height or weight on file to calculate BMI.    GENERAL APPEARANCE: awake, alert, oriented, in no acute distress and well developed, well nourished  LUNGS:  breathing nonlabored  EXTREMITIES: no clubbing, cyanosis      Right knee Exam:   ----------  Hip Exam  ----------  FLEXION CONTRACTURE: None  FLEXION: 110 degrees  INTERNAL ROTATION: 20 degrees at 90 degrees of flexion   EXTERNAL ROTATION: 40 degrees at 90  degrees of flexion    PAIN WITH HIP MOTION: no  ----------  Knee Exam  ----------  ALIGNMENT: neutral, no varus or valgus deformity     RANGE OF MOTION:  Normal (0-120 degrees) with no extensor lag or flexion contracture  LIGAMENTOUS STABILITY:   stable to varus and valgus stress at terminal extension and 30 degrees without any evidence of laxity     STRENGTH:  5/5 knee flexion, extension. 5/5 ankle dorsiflexion and plantarflexion.     PAIN WITH PALPATION: denies tenderness to palpation about the knee, denies medial or lateral joint line pain  KNEE EFFUSION: no  PAIN WITH KNEE ROM: no  PATELLAR CREPITUS: no  SPECIAL EXAM FINDINGS:  Negative patellar compression    REFLEXES:  PATELLAR 2+/4  ACHILLES 2+/4    CLONUS: negative  STRAIGHT LEG TEST:   negative    SENSATION TO LIGHT TOUCH:  DEEP PERONEAL/SUPERFICIAL PERONEAL/SURAL/SAPHENOUS/TIBIAL:   intact    EDEMA:  no  ERYTHEMA:  no  WOUNDS/INCISIONS: Well-healed arthroscopic knee incisions _______________________________________________________________  _______________________________________________________________    RADIOGRAPHIC FINDINGS:   No new imaging today    Assessment/Plan:   Diagnosis Plan   1. S/P right knee arthroscopy          Patient is doing well status post knee arthroscopy.  He does have some mild residual inflammation for which I recommended anti-inflammatories as needed.  Otherwise he is doing well and having no limitations.  His mechanical symptoms have resolved.  Continue activity as tolerated.  He will follow-up as needed.    Diogo Alvares MD  01/16/24  08:52 EST

## 2025-03-20 ENCOUNTER — OFFICE VISIT (OUTPATIENT)
Dept: ORTHOPEDIC SURGERY | Facility: CLINIC | Age: 50
End: 2025-03-20
Payer: COMMERCIAL

## 2025-03-20 VITALS
SYSTOLIC BLOOD PRESSURE: 128 MMHG | WEIGHT: 185 LBS | BODY MASS INDEX: 25.9 KG/M2 | DIASTOLIC BLOOD PRESSURE: 84 MMHG | HEIGHT: 71 IN

## 2025-03-20 DIAGNOSIS — S76.392A AVULSION OF LEFT HAMSTRING MUSCLE, INITIAL ENCOUNTER: Primary | ICD-10-CM

## 2025-03-20 DIAGNOSIS — M79.652 LEFT THIGH PAIN: ICD-10-CM

## 2025-03-20 RX ORDER — COVID-19 ANTIGEN TEST
KIT MISCELLANEOUS
COMMUNITY

## 2025-03-20 RX ORDER — MELOXICAM 15 MG/1
TABLET ORAL
Qty: 90 TABLET | Refills: 1 | Status: SHIPPED | OUTPATIENT
Start: 2025-03-20

## 2025-03-20 NOTE — PROGRESS NOTES
Orthopaedic Clinic Note: Hip Established Patient    Chief Complaint   Patient presents with    Left Thigh - Pain        HPI    Grace Ellison is a 49 y.o. male who presents with new problem of: left  thigh  pain.  Onset: mechanical fall. The issue has been ongoing for 1 day(s). Pain is a 6/10 on the pain scale. Pain is described as throbbing, stabbing, and shooting. Associated symptoms include pain, swelling, stiffness, giving way/buckling, and numbness/tingling . The pain is worse with walking, standing, sitting, climbing stairs, sleeping, lying on affected side, and rising from seated position; nothing improve the pain. Previous treatments have included: NSAIDS.    I have reviewed the following portions of the patient's history:History of Present Illness and review of systems.    Past Medical History:   Diagnosis Date    Ankle sprain various times over years    all treated w'ice/rest    Arthritis     Fracture of wrist right hand, broken wrist. 3 places    17 years old. treated successfully by [facility]    Fracture, foot 10 yrs. ago    treated successfully by Dr. Brittany Kitchen    Neck strain C-4/C-5 history of injury    treated with chiropractic adjustments every 3-4 weeks.    Seasonal allergies       Past Surgical History:   Procedure Laterality Date    APPENDECTOMY N/A 02/07/2020    Procedure: APPENDECTOMY LAPAROSCOPIC;  Surgeon: Xenia Hardin MD;  Location:  [facility];  Service: General;  Laterality: N/A;    BICEPS TENDON REPAIR Right     COLONOSCOPY      up to date    KNEE ARTHROSCOPY W/ MENISCECTOMY Right 12/04/2023    Procedure: KNEE ARTHROSCOPY WITH PARTIAL LATERAL MENISCECTOMY - RIGHT;  Surgeon: Diogo Alvares MD;  Location:  [facility];  Service: Orthopedics;  Laterality: Right;    KNEE SURGERY  12/04/2023    successful surgery w’Dr. Diogo Alvares. Meniscus tears/repair    LASIK Bilateral     LYMPH NODE BIOPSY      WISDOM TOOTH EXTRACTION        History reviewed. No pertinent family  "history.  Social History     Socioeconomic History    Marital status:    Tobacco Use    Smoking status: Never    Smokeless tobacco: Never    Tobacco comments:     never, no history   Vaping Use    Vaping status: Never Used   Substance and Sexual Activity    Alcohol use: Yes     Alcohol/week: 1.0 standard drink of alcohol     Types: 1 Cans of beer per week     Comment: drink 1-2 beers every 60-90 days    Drug use: Never    Sexual activity: Yes     Partners: Female     Comment:  --Spouse      Current Outpatient Medications on File Prior to Visit   Medication Sig Dispense Refill    levocetirizine (XYZAL) 5 MG tablet Take 1 tablet by mouth Every Evening.      Naproxen Sodium (Aleve) 220 MG capsule Take  by mouth.      [DISCONTINUED] meloxicam (MOBIC) 15 MG tablet 1 PO Daily with food.  Stop if you have upset stomach/stomach irritation. 30 tablet 1     No current facility-administered medications on file prior to visit.      No Known Allergies     Review of Systems   Constitutional: Negative.    HENT: Negative.     Eyes: Negative.    Respiratory: Negative.     Cardiovascular: Negative.    Gastrointestinal: Negative.    Endocrine: Negative.    Genitourinary: Negative.    Musculoskeletal:  Positive for arthralgias.   Skin: Negative.    Allergic/Immunologic: Negative.    Neurological: Negative.    Hematological: Negative.    Psychiatric/Behavioral: Negative.          The patient's Review of Systems was personally reviewed and confirmed as accurate.    Physical Exam  Blood pressure 128/84, height 180.3 cm (70.98\"), weight 83.9 kg (185 lb).    Body mass index is 25.81 kg/m².    GENERAL APPEARANCE: awake, alert, oriented, in no acute distress and well developed, well nourished  LUNGS:  breathing nonlabored  EXTREMITIES: no clubbing, cyanosis  PERIPHERAL PULSES: palpable dorsalis pedis and posterior tibial pulses bilaterally.    GAIT:  Antalgic            Hip Exam:  Left    RANGE OF MOTION:  EXTENSION/FLEXION:  " normal (0-110 degrees)  IR (at 90 degrees of flexion):  20  ER (at 90 degrees of flexion):  40  PAIN WITH HIP MOTION:  yes, posteriorly with hip flexion  PAIN WITH LOGROLL:  no     STINCHFIELD TEST: negative    STRENGTH:  ABDUCTOR:  5/5  ADDUCTOR:  5/5  HIP FLEXION:  5/5    GREATER TROCHANTER BURSAL PAIN:  no  Tender to palpation at ischial bursa and hamstring origin    SENSATION TO LIGHT TOUCH:  DEEP PERONEAL/SUPERFICIAL PERONEAL/SURAL/SAPHENOUS/TIBIAL:   intact    EDEMA:  no  ERYTHEMA:  no  WOUNDS/INCISIONS:   no  _________________________________________________________________  _________________________________________________________________    RADIOGRAPHIC FINDINGS:   Indication: Left hip pain    Comparison: No prior xrays are available for comparison    Left hip 2 views: Radiographs demonstrate no acute bony injury to the hip joint.  There does appear to be a calcified bone fragment adjacent to the ischium concerning for hamstring avulsion injury.    Assessment/Plan:   Diagnosis Plan   1. Avulsion of left hamstring muscle, initial encounter  MRI Hip Left Without Contrast    meloxicam (MOBIC) 15 MG tablet      2. Left thigh pain  XR Femur 2 View Left        Patient's x-ray and exam findings are concerning for avulsion injury of the hamstring origin.  I recommended an MRI to assess the extent of the soft tissue injury.  We will order this on a stat basis.  Depending on the severity of the injury, operative intervention for hamstring repair may be indicated.  I will see him back after the MRI to discuss results and how to proceed.  I will prescribe him meloxicam in the interval for pain control    Diogo Alvares MD  03/20/25  10:34 EDT

## 2025-03-23 ENCOUNTER — HOSPITAL ENCOUNTER (OUTPATIENT)
Facility: HOSPITAL | Age: 50
Discharge: HOME | End: 2025-03-23
Admitting: ORTHOPAEDIC SURGERY
Payer: COMMERCIAL

## 2025-03-23 DIAGNOSIS — S76.392A AVULSION OF LEFT HAMSTRING MUSCLE, INITIAL ENCOUNTER: ICD-10-CM

## 2025-03-23 PROCEDURE — 73721 MRI JNT OF LWR EXTRE W/O DYE: CPT

## (undated) DEVICE — TB CASSET ARTHSCP CROSSFLOW INFLOW LF

## (undated) DEVICE — SUT ETHLN 3/0 PC5 18IN 1893G

## (undated) DEVICE — UNDERGLV SURG BIOGEL INDICAT PI SZ8 BLU

## (undated) DEVICE — STRAP POSTN KN/BDY FM 5X72IN DISP

## (undated) DEVICE — GLV SURG SENSICARE MICRO PF LF 6.5 STRL

## (undated) DEVICE — PAD ARMBRD SURG CONVOL 7.5X20X2IN

## (undated) DEVICE — ENDOPATH ETS-FLEX45 ARTICULATING ENDOSCOPIC LINEAR CUTTER, NO RELOAD: Brand: ENDOPATH

## (undated) DEVICE — DRSNG GZ PETROLTM XEROFORM CURAD 1X8IN STRL

## (undated) DEVICE — GLV SURG SENSICARE PI ORTHO SZ8 LF STRL

## (undated) DEVICE — ENDOPOUCH RETRIEVER SPECIMEN RETRIEVAL BAGS: Brand: ENDOPOUCH RETRIEVER

## (undated) DEVICE — BNDG ESMARK 6INX9FT STRL

## (undated) DEVICE — LEGGINGS, PAIR, 29X43, STERILE: Brand: MEDLINE

## (undated) DEVICE — GLV SURG DERMASSURE GRN LF PF 7.0

## (undated) DEVICE — PK ARTHSCP KN 10

## (undated) DEVICE — SKIN AFFIX SURG ADHESIVE 72/CS 0.55ML: Brand: MEDLINE

## (undated) DEVICE — UNDERCAST PADDING: Brand: DEROYAL

## (undated) DEVICE — SUT MONOCRYL PLS ANTIB UND 3/0  PS1 27IN

## (undated) DEVICE — ENDOPATH XCEL BLADELESS TROCARS WITH STABILITY SLEEVES: Brand: ENDOPATH XCEL

## (undated) DEVICE — DRSNG SURESITE WNDW 4X4.5

## (undated) DEVICE — COVER,LIGHT HANDLE,FLX,1/PK: Brand: MEDLINE INDUSTRIES, INC.

## (undated) DEVICE — NEEDLE,HYPODERM,SAFETY, 22GX1.5: Brand: MEDLINE

## (undated) DEVICE — SUT VIC 0 UR6 27IN VCP603H

## (undated) DEVICE — BLD CUT FORMLA RESECTOR 4.0MM

## (undated) DEVICE — GLV SURG SENSICARE PI MIC PF SZ9 LF STRL

## (undated) DEVICE — PAD,EYE,1-5/8X2 5/8,STERILE,LF,1/PK: Brand: MEDLINE